# Patient Record
Sex: MALE | Race: WHITE | NOT HISPANIC OR LATINO
[De-identification: names, ages, dates, MRNs, and addresses within clinical notes are randomized per-mention and may not be internally consistent; named-entity substitution may affect disease eponyms.]

---

## 2017-02-14 ENCOUNTER — PRIOR ORIGINAL RECORDS (OUTPATIENT)
Dept: OTHER | Age: 67
End: 2017-02-14

## 2017-02-22 ENCOUNTER — HOSPITAL ENCOUNTER (EMERGENCY)
Age: 67
Discharge: HOME | End: 2017-02-22
Attending: EMERGENCY MEDICINE
Payer: MEDICARE

## 2017-02-22 VITALS
WEIGHT: 245 LBS | DIASTOLIC BLOOD PRESSURE: 66 MMHG | RESPIRATION RATE: 20 BRPM | HEART RATE: 92 BPM | SYSTOLIC BLOOD PRESSURE: 107 MMHG | TEMPERATURE: 98 F | BODY MASS INDEX: 35.07 KG/M2 | OXYGEN SATURATION: 95 % | HEIGHT: 70 IN

## 2017-02-22 DIAGNOSIS — B34.9 VIRAL SYNDROME: ICD-10-CM

## 2017-02-22 DIAGNOSIS — R11.2 NAUSEA AND VOMITING IN ADULT: Primary | ICD-10-CM

## 2017-02-22 LAB
ALBUMIN SERPL-MCNC: 3.4 G/DL (ref 3.5–4.8)
ALP LIVER SERPL-CCNC: 67 U/L (ref 45–117)
ALT SERPL-CCNC: 33 U/L (ref 17–63)
AST SERPL-CCNC: 15 U/L (ref 15–41)
BASOPHILS # BLD AUTO: 0.03 X10(3) UL (ref 0–0.1)
BASOPHILS NFR BLD AUTO: 0.4 %
BILIRUB SERPL-MCNC: 0.5 MG/DL (ref 0.1–2)
BUN BLD-MCNC: 23 MG/DL (ref 8–20)
CALCIUM BLD-MCNC: 7.1 MG/DL (ref 8.3–10.3)
CHLORIDE: 103 MMOL/L (ref 101–111)
CO2: 27 MMOL/L (ref 22–32)
CREAT BLD-MCNC: 0.92 MG/DL (ref 0.7–1.3)
EOSINOPHIL # BLD AUTO: 0.05 X10(3) UL (ref 0–0.3)
EOSINOPHIL NFR BLD AUTO: 0.7 %
ERYTHROCYTE [DISTWIDTH] IN BLOOD BY AUTOMATED COUNT: 13.1 % (ref 11.5–16)
GLUCOSE BLD-MCNC: 191 MG/DL (ref 70–99)
HCT VFR BLD AUTO: 45.2 % (ref 37–53)
HGB BLD-MCNC: 15.4 G/DL (ref 13–17)
IMMATURE GRANULOCYTE COUNT: 0.01 X10(3) UL (ref 0–1)
IMMATURE GRANULOCYTE RATIO %: 0.1 %
LIPASE: 134 U/L (ref 73–393)
LYMPHOCYTES # BLD AUTO: 0.88 X10(3) UL (ref 0.9–4)
LYMPHOCYTES NFR BLD AUTO: 11.8 %
M PROTEIN MFR SERPL ELPH: 6.6 G/DL (ref 6.1–8.3)
MCH RBC QN AUTO: 30.5 PG (ref 27–33.2)
MCHC RBC AUTO-ENTMCNC: 34.1 G/DL (ref 31–37)
MCV RBC AUTO: 89.5 FL (ref 80–99)
MONOCYTES # BLD AUTO: 0.82 X10(3) UL (ref 0.1–0.6)
MONOCYTES NFR BLD AUTO: 11 %
NEUTROPHIL ABS PRELIM: 5.67 X10 (3) UL (ref 1.3–6.7)
NEUTROPHILS # BLD AUTO: 5.67 X10(3) UL (ref 1.3–6.7)
NEUTROPHILS NFR BLD AUTO: 76 %
PLATELET # BLD AUTO: 181 10(3)UL (ref 150–450)
POTASSIUM SERPL-SCNC: 4.1 MMOL/L (ref 3.6–5.1)
RBC # BLD AUTO: 5.05 X10(6)UL (ref 3.8–5.8)
RED CELL DISTRIBUTION WIDTH-SD: 42.8 FL (ref 35.1–46.3)
SODIUM SERPL-SCNC: 137 MMOL/L (ref 136–144)
WBC # BLD AUTO: 7.5 X10(3) UL (ref 4–13)

## 2017-02-22 PROCEDURE — 85025 COMPLETE CBC W/AUTO DIFF WBC: CPT

## 2017-02-22 PROCEDURE — 80053 COMPREHEN METABOLIC PANEL: CPT

## 2017-02-22 PROCEDURE — 99284 EMERGENCY DEPT VISIT MOD MDM: CPT

## 2017-02-22 PROCEDURE — 96374 THER/PROPH/DIAG INJ IV PUSH: CPT

## 2017-02-22 PROCEDURE — 82272 OCCULT BLD FECES 1-3 TESTS: CPT

## 2017-02-22 PROCEDURE — 96361 HYDRATE IV INFUSION ADD-ON: CPT

## 2017-02-22 PROCEDURE — 83690 ASSAY OF LIPASE: CPT

## 2017-02-22 RX ORDER — ONDANSETRON 4 MG/1
4 TABLET, ORALLY DISINTEGRATING ORAL EVERY 4 HOURS PRN
Qty: 10 TABLET | Refills: 0 | Status: SHIPPED | OUTPATIENT
Start: 2017-02-22 | End: 2017-03-01

## 2017-02-22 RX ORDER — OMEPRAZOLE 20 MG/1
20 CAPSULE, DELAYED RELEASE ORAL
COMMUNITY

## 2017-02-22 RX ORDER — SODIUM CHLORIDE 9 MG/ML
INJECTION, SOLUTION INTRAVENOUS ONCE
Status: COMPLETED | OUTPATIENT
Start: 2017-02-22 | End: 2017-02-22

## 2017-02-22 RX ORDER — ONDANSETRON 2 MG/ML
4 INJECTION INTRAMUSCULAR; INTRAVENOUS ONCE
Status: COMPLETED | OUTPATIENT
Start: 2017-02-22 | End: 2017-02-22

## 2017-02-22 NOTE — ED INITIAL ASSESSMENT (HPI)
Pt started feeling nauseated on Monday and vomiting on Monday took pepto bismol and now having black stools.

## 2017-03-08 ENCOUNTER — PRIOR ORIGINAL RECORDS (OUTPATIENT)
Dept: OTHER | Age: 67
End: 2017-03-08

## 2017-03-08 ENCOUNTER — LAB ENCOUNTER (OUTPATIENT)
Dept: LAB | Age: 67
End: 2017-03-08
Attending: INTERNAL MEDICINE
Payer: MEDICARE

## 2017-03-08 DIAGNOSIS — E78.00 PURE HYPERCHOLESTEROLEMIA: ICD-10-CM

## 2017-03-08 DIAGNOSIS — I25.10 CAD (CORONARY ARTERY DISEASE): Primary | ICD-10-CM

## 2017-03-08 DIAGNOSIS — I10 HYPERTENSION: ICD-10-CM

## 2017-03-08 LAB
ALBUMIN SERPL-MCNC: 3.9 G/DL (ref 3.5–4.8)
ALP LIVER SERPL-CCNC: 102 U/L (ref 45–117)
ALT SERPL-CCNC: 32 U/L (ref 17–63)
AST SERPL-CCNC: 9 U/L (ref 15–41)
BASOPHILS # BLD AUTO: 0.06 X10(3) UL (ref 0–0.1)
BASOPHILS NFR BLD AUTO: 0.6 %
BILIRUB SERPL-MCNC: 0.6 MG/DL (ref 0.1–2)
BUN BLD-MCNC: 32 MG/DL (ref 8–20)
CALCIUM BLD-MCNC: 8.9 MG/DL (ref 8.3–10.3)
CHLORIDE: 103 MMOL/L (ref 101–111)
CHOLEST SMN-MCNC: 134 MG/DL (ref ?–200)
CO2: 27 MMOL/L (ref 22–32)
CREAT BLD-MCNC: 0.99 MG/DL (ref 0.7–1.3)
EOSINOPHIL # BLD AUTO: 0.1 X10(3) UL (ref 0–0.3)
EOSINOPHIL NFR BLD AUTO: 1 %
ERYTHROCYTE [DISTWIDTH] IN BLOOD BY AUTOMATED COUNT: 13.3 % (ref 11.5–16)
GLUCOSE BLD-MCNC: 158 MG/DL (ref 70–99)
HCT VFR BLD AUTO: 44 % (ref 37–53)
HDLC SERPL-MCNC: 44 MG/DL (ref 45–?)
HDLC SERPL: 3.05 {RATIO} (ref ?–4.97)
HGB BLD-MCNC: 15.1 G/DL (ref 13–17)
IMMATURE GRANULOCYTE COUNT: 0.06 X10(3) UL (ref 0–1)
IMMATURE GRANULOCYTE RATIO %: 0.6 %
LDLC SERPL CALC-MCNC: 53 MG/DL (ref ?–130)
LYMPHOCYTES # BLD AUTO: 2 X10(3) UL (ref 0.9–4)
LYMPHOCYTES NFR BLD AUTO: 19.8 %
M PROTEIN MFR SERPL ELPH: 7 G/DL (ref 6.1–8.3)
MCH RBC QN AUTO: 31.1 PG (ref 27–33.2)
MCHC RBC AUTO-ENTMCNC: 34.3 G/DL (ref 31–37)
MCV RBC AUTO: 90.5 FL (ref 80–99)
MONOCYTES # BLD AUTO: 0.97 X10(3) UL (ref 0.1–0.6)
MONOCYTES NFR BLD AUTO: 9.6 %
NEUTROPHIL ABS PRELIM: 6.89 X10 (3) UL (ref 1.3–6.7)
NEUTROPHILS # BLD AUTO: 6.89 X10(3) UL (ref 1.3–6.7)
NEUTROPHILS NFR BLD AUTO: 68.4 %
NONHDLC SERPL-MCNC: 90 MG/DL (ref ?–130)
PLATELET # BLD AUTO: 209 10(3)UL (ref 150–450)
POTASSIUM SERPL-SCNC: 4.6 MMOL/L (ref 3.6–5.1)
RBC # BLD AUTO: 4.86 X10(6)UL (ref 3.8–5.8)
RED CELL DISTRIBUTION WIDTH-SD: 43.4 FL (ref 35.1–46.3)
SODIUM SERPL-SCNC: 138 MMOL/L (ref 136–144)
TRIGLYCERIDES: 187 MG/DL (ref ?–150)
VLDL: 37 MG/DL (ref 5–40)
WBC # BLD AUTO: 10.1 X10(3) UL (ref 4–13)

## 2017-03-08 PROCEDURE — 85025 COMPLETE CBC W/AUTO DIFF WBC: CPT

## 2017-03-08 PROCEDURE — 80053 COMPREHEN METABOLIC PANEL: CPT

## 2017-03-08 PROCEDURE — 36415 COLL VENOUS BLD VENIPUNCTURE: CPT

## 2017-03-08 PROCEDURE — 80061 LIPID PANEL: CPT

## 2017-03-10 ENCOUNTER — PRIOR ORIGINAL RECORDS (OUTPATIENT)
Dept: OTHER | Age: 67
End: 2017-03-10

## 2017-03-14 LAB
ALBUMIN: 3.9 G/DL
ALKALINE PHOSPHATATE(ALK PHOS): 102 IU/L
BILIRUBIN TOTAL: 0.6 MG/DL
BUN: 32 MG/DL
CALCIUM: 8.9 MG/DL
CHLORIDE: 103 MEQ/L
CHOLESTEROL, TOTAL: 134 MG/DL
CREATININE, SERUM: 0.99 MG/DL
GLUCOSE: 158 MG/DL
HDL CHOLESTEROL: 44 MG/DL
HEMATOCRIT: 44 %
HEMOGLOBIN: 15.1 G/DL
LDL CHOLESTEROL: 53 MG/DL
PLATELETS: 209 K/UL
POTASSIUM, SERUM: 4.6 MEQ/L
PROTEIN, TOTAL: 7 G/DL
RED BLOOD COUNT: 4.86 X 10-6/U
SGOT (AST): 9 IU/L
SGPT (ALT): 32 IU/L
SODIUM: 138 MEQ/L
TRIGLYCERIDES: 187 MG/DL
WHITE BLOOD COUNT: 10.1 X 10-3/U

## 2017-09-02 ENCOUNTER — APPOINTMENT (OUTPATIENT)
Dept: GENERAL RADIOLOGY | Age: 67
End: 2017-09-02
Attending: EMERGENCY MEDICINE
Payer: MEDICARE

## 2017-09-02 ENCOUNTER — HOSPITAL ENCOUNTER (EMERGENCY)
Age: 67
Discharge: HOME | End: 2017-09-02
Attending: EMERGENCY MEDICINE
Payer: MEDICARE

## 2017-09-02 VITALS
OXYGEN SATURATION: 95 % | DIASTOLIC BLOOD PRESSURE: 90 MMHG | TEMPERATURE: 98 F | SYSTOLIC BLOOD PRESSURE: 146 MMHG | BODY MASS INDEX: 36.36 KG/M2 | RESPIRATION RATE: 16 BRPM | WEIGHT: 254 LBS | HEART RATE: 87 BPM | HEIGHT: 70 IN

## 2017-09-02 DIAGNOSIS — S80.11XA CONTUSION OF LOWER EXTREMITY, RIGHT, INITIAL ENCOUNTER: Primary | ICD-10-CM

## 2017-09-02 DIAGNOSIS — T14.8XXA HEMATOMA: ICD-10-CM

## 2017-09-02 PROCEDURE — 73590 X-RAY EXAM OF LOWER LEG: CPT | Performed by: EMERGENCY MEDICINE

## 2017-09-02 PROCEDURE — 99283 EMERGENCY DEPT VISIT LOW MDM: CPT

## 2017-09-02 NOTE — ED INITIAL ASSESSMENT (HPI)
Pt states that he \"hit my leg on the side of the bathtub, and now have this big bruise\". Hematoma noted to RLE. + CMS noted. States he takes plavix.

## 2017-09-02 NOTE — ED PROVIDER NOTES
Patient Seen in: [facility] Emergency Department In [place]    History   Patient presents with:  Lower Extremity Injury (musculoskeletal)    Stated Complaint:     HPI    The patient is a 38-year-old male who slipped in the bathtub and injured his right ti daily. Patient taking differently: Take 50 mg by mouth 2 (two) times daily. spironolactone 25 MG Oral Tab,  Take 1 tablet (25 mg total) by mouth daily. Atorvastatin Calcium 40 MG Oral Tab,  Take 1 tablet (40 mg total) by mouth nightly.    Valsartan-H no signs of trauma. Oral mucosa is wet. Lungs: Clear to auscultation without wheezing or retractions    Cardiovascular: Regular without murmurs    Extremities: Good pulses bilaterally. There is bruising noted on the right medial tib-fib.   There is n

## 2017-12-05 ENCOUNTER — HOSPITAL ENCOUNTER (EMERGENCY)
Age: 67
Discharge: HOME | End: 2017-12-05
Payer: MEDICARE

## 2017-12-05 VITALS
RESPIRATION RATE: 16 BRPM | WEIGHT: 260 LBS | SYSTOLIC BLOOD PRESSURE: 158 MMHG | BODY MASS INDEX: 37 KG/M2 | DIASTOLIC BLOOD PRESSURE: 86 MMHG | TEMPERATURE: 98 F | HEART RATE: 85 BPM | OXYGEN SATURATION: 96 %

## 2017-12-05 DIAGNOSIS — S61.215A LACERATION OF LEFT RING FINGER WITHOUT FOREIGN BODY WITHOUT DAMAGE TO NAIL, INITIAL ENCOUNTER: Primary | ICD-10-CM

## 2017-12-05 PROCEDURE — 99283 EMERGENCY DEPT VISIT LOW MDM: CPT

## 2017-12-05 PROCEDURE — 90471 IMMUNIZATION ADMIN: CPT

## 2017-12-05 NOTE — ED PROVIDER NOTES
Patient Seen in: [facility] Emergency Department In [place]    History   Patient presents with:  Laceration Abrasion (integumentary)    Stated Complaint: left 4th finger laceration on garage ladder    HPI    CHIEF COMPLAINT: Finger laceration     HISTORY Hyperlipidemia associated with type 2 diabetes mellitus ([facility] 10/5/2016   • Hypertension    • Hypertension associated with diabetes ([facility] 10/5/2016   • Long-term insulin use ([facility] 10/5/2016   • Sleep apnea    • Type II or unspecified type diabetes mellitus aspect of the left fourth digit there is a small flap laceration that is not actively bleeding. There is no nailbed involvement. The wound is irrigated and no foreign body is noted. There is no surrounding erythema. No draining noted.   Patient was dres

## 2018-02-27 ENCOUNTER — MYAURORA ACCOUNT LINK (OUTPATIENT)
Dept: OTHER | Age: 68
End: 2018-02-27

## 2018-02-27 ENCOUNTER — PRIOR ORIGINAL RECORDS (OUTPATIENT)
Dept: OTHER | Age: 68
End: 2018-02-27

## 2018-05-24 ENCOUNTER — OFFICE VISIT (OUTPATIENT)
Dept: FAMILY MEDICINE CLINIC | Facility: CLINIC | Age: 68
End: 2018-05-24

## 2018-05-24 VITALS
DIASTOLIC BLOOD PRESSURE: 78 MMHG | HEART RATE: 78 BPM | TEMPERATURE: 99 F | WEIGHT: 271 LBS | OXYGEN SATURATION: 97 % | HEIGHT: 70 IN | SYSTOLIC BLOOD PRESSURE: 136 MMHG | BODY MASS INDEX: 38.8 KG/M2 | RESPIRATION RATE: 20 BRPM

## 2018-05-24 DIAGNOSIS — L03.115 CELLULITIS OF RIGHT LOWER EXTREMITY: Primary | ICD-10-CM

## 2018-05-24 PROCEDURE — 99202 OFFICE O/P NEW SF 15 MIN: CPT | Performed by: NURSE PRACTITIONER

## 2018-05-24 RX ORDER — ESOMEPRAZOLE MAGNESIUM 40 MG/1
40 CAPSULE, DELAYED RELEASE ORAL
COMMUNITY
Start: 2012-10-24

## 2018-05-24 RX ORDER — DULOXETIN HYDROCHLORIDE 30 MG/1
CAPSULE, DELAYED RELEASE ORAL
COMMUNITY
Start: 2018-05-21

## 2018-05-24 RX ORDER — INSULIN LISPRO 100 [IU]/ML
INJECTION, SOLUTION INTRAVENOUS; SUBCUTANEOUS
COMMUNITY
Start: 2018-03-01

## 2018-05-24 RX ORDER — CEPHALEXIN 500 MG/1
500 CAPSULE ORAL 4 TIMES DAILY
Qty: 28 CAPSULE | Refills: 0 | Status: SHIPPED | OUTPATIENT
Start: 2018-05-24 | End: 2018-05-31

## 2018-05-24 RX ORDER — CARISOPRODOL 350 MG/1
350 TABLET ORAL
COMMUNITY
Start: 2013-09-18

## 2018-05-24 RX ORDER — NABUMETONE 750 MG/1
TABLET, FILM COATED ORAL
COMMUNITY
Start: 2018-05-14

## 2018-05-24 RX ORDER — TESTOSTERONE CYPIONATE 200 MG/ML
INJECTION INTRAMUSCULAR
COMMUNITY
Start: 2018-04-18

## 2018-05-24 RX ORDER — GLIPIZIDE 10 MG/1
10 TABLET ORAL
COMMUNITY
Start: 2012-10-24 | End: 2018-05-24

## 2018-05-24 RX ORDER — FUROSEMIDE 40 MG/1
40 TABLET ORAL
COMMUNITY
Start: 2012-10-24

## 2018-05-24 RX ORDER — HYDRALAZINE HYDROCHLORIDE 50 MG/1
50 TABLET, FILM COATED ORAL 2 TIMES DAILY
COMMUNITY
Start: 2016-08-25

## 2018-05-24 RX ORDER — POTASSIUM CHLORIDE 750 MG/1
TABLET, EXTENDED RELEASE ORAL
COMMUNITY
Start: 2012-12-05

## 2018-05-24 RX ORDER — ISOSORBIDE MONONITRATE 30 MG/1
30 TABLET, EXTENDED RELEASE ORAL
COMMUNITY
Start: 2016-10-26

## 2018-05-24 NOTE — PATIENT INSTRUCTIONS
Cellulitis  Cellulitis is an infection of the deep layers of skin. A break in the skin, such as a cut or scratch, can let bacteria under the skin. If the bacteria get to deep layers of the skin, it can be serious.  If not treated, cellulitis can get into · Fever higher of 100.4º F (38.0º C) or higher after 2 days on antibiotics  Date Last Reviewed: 9/1/2016  © 6169-0190 [employer]. 1407 [facility], [address] [place] [address]. All rights reserved.  This information is not intended as a substitut

## 2018-05-24 NOTE — PROGRESS NOTES
CHIEF COMPLAINT:   Patient presents with:  Derm Problem: right leg/ankle/foot swelling and soreness x2 days    HPI:     Gila Castellon is a 69year old male who presents with concerns of skin infection. Patient first noticed symptoms 2 days ago.   Reports e Atorvastatin Calcium 40 MG Oral Tab Take 1 tablet (40 mg total) by mouth nightly. Disp: 30 tablet Rfl: 6   allopurinol 300 MG Oral Tab Take 300 mg by mouth daily. Disp:  Rfl:    Clopidogrel Bisulfate 75 MG Oral Tab Take 75 mg by mouth daily.  Disp:  Rfl: CARDIOVASCULAR: denies chest pain on exertion or rest.  GI: no nausea, no vomiting or abdominal pain  NEURO: no abnormal sensation, no tingling of the skin or numbness.     EXAM:   /78   Pulse 78   Temp 98.8 °F (37.1 °C) (Oral)   Resp 20   Ht 70\"   W Cellulitis is an infection of the deep layers of skin. A break in the skin, such as a cut or scratch, can let bacteria under the skin. If the bacteria get to deep layers of the skin, it can be serious.  If not treated, cellulitis can get into the bloodstrea Date Last Reviewed: 9/1/2016  © 4943-8812 [employer]. 1407 [facility], [address]. All rights reserved. This information is not intended as a substitute for professional medical care.  Always follow your healthcare professional'

## 2018-10-25 ENCOUNTER — PRIOR ORIGINAL RECORDS (OUTPATIENT)
Dept: OTHER | Age: 68
End: 2018-10-25

## 2019-02-28 VITALS
DIASTOLIC BLOOD PRESSURE: 80 MMHG | HEIGHT: 70 IN | BODY MASS INDEX: 38.37 KG/M2 | HEART RATE: 84 BPM | SYSTOLIC BLOOD PRESSURE: 112 MMHG | WEIGHT: 268 LBS

## 2019-03-01 VITALS
HEIGHT: 70 IN | DIASTOLIC BLOOD PRESSURE: 70 MMHG | WEIGHT: 253 LBS | BODY MASS INDEX: 36.22 KG/M2 | HEART RATE: 92 BPM | SYSTOLIC BLOOD PRESSURE: 130 MMHG

## 2019-03-29 RX ORDER — ATORVASTATIN CALCIUM 40 MG/1
TABLET, FILM COATED ORAL
Qty: 90 TABLET | Refills: 0 | Status: SHIPPED | OUTPATIENT
Start: 2019-03-29 | End: 2019-06-23 | Stop reason: SDUPTHER

## 2019-03-29 RX ORDER — SPIRONOLACTONE 25 MG/1
TABLET ORAL
Qty: 90 TABLET | Refills: 0 | Status: SHIPPED | OUTPATIENT
Start: 2019-03-29 | End: 2019-06-23 | Stop reason: SDUPTHER

## 2019-05-06 RX ORDER — HYDRALAZINE HYDROCHLORIDE 50 MG/1
TABLET, FILM COATED ORAL
Qty: 180 TABLET | Refills: 0 | Status: SHIPPED | OUTPATIENT
Start: 2019-05-06 | End: 2019-07-31 | Stop reason: SDUPTHER

## 2019-05-06 RX ORDER — ISOSORBIDE MONONITRATE 30 MG/1
TABLET, EXTENDED RELEASE ORAL
Qty: 90 TABLET | Refills: 0 | Status: SHIPPED | OUTPATIENT
Start: 2019-05-06 | End: 2019-07-31 | Stop reason: SDUPTHER

## 2019-06-24 RX ORDER — ATORVASTATIN CALCIUM 40 MG/1
TABLET, FILM COATED ORAL
Qty: 90 TABLET | Refills: 0 | Status: SHIPPED | OUTPATIENT
Start: 2019-06-24 | End: 2019-09-20 | Stop reason: SDUPTHER

## 2019-06-24 RX ORDER — SPIRONOLACTONE 25 MG/1
TABLET ORAL
Qty: 90 TABLET | Refills: 0 | Status: SHIPPED | OUTPATIENT
Start: 2019-06-24 | End: 2019-09-20 | Stop reason: SDUPTHER

## 2019-07-23 ENCOUNTER — OFFICE VISIT (OUTPATIENT)
Dept: CARDIOLOGY | Age: 69
End: 2019-07-23

## 2019-07-23 VITALS
DIASTOLIC BLOOD PRESSURE: 74 MMHG | HEART RATE: 95 BPM | WEIGHT: 259 LBS | HEIGHT: 69 IN | SYSTOLIC BLOOD PRESSURE: 140 MMHG | BODY MASS INDEX: 38.36 KG/M2

## 2019-07-23 DIAGNOSIS — I10 ESSENTIAL HYPERTENSION: ICD-10-CM

## 2019-07-23 DIAGNOSIS — E78.2 HYPERLIPIDEMIA, MIXED: ICD-10-CM

## 2019-07-23 DIAGNOSIS — I25.10 CORONARY ARTERY DISEASE INVOLVING NATIVE CORONARY ARTERY OF NATIVE HEART WITHOUT ANGINA PECTORIS: Primary | ICD-10-CM

## 2019-07-23 PROCEDURE — 99214 OFFICE O/P EST MOD 30 MIN: CPT | Performed by: INTERNAL MEDICINE

## 2019-07-23 RX ORDER — GLIPIZIDE 10 MG/1
10 TABLET ORAL
COMMUNITY

## 2019-07-23 RX ORDER — CLOPIDOGREL BISULFATE 75 MG/1
75 TABLET ORAL DAILY
COMMUNITY
End: 2020-08-25 | Stop reason: ALTCHOICE

## 2019-07-23 RX ORDER — ISOSORBIDE DINITRATE 30 MG/1
30 TABLET ORAL DAILY
COMMUNITY
End: 2020-07-28 | Stop reason: CLARIF

## 2019-07-23 RX ORDER — ESOMEPRAZOLE MAGNESIUM 40 MG/1
40 CAPSULE, DELAYED RELEASE ORAL 2 TIMES DAILY
COMMUNITY

## 2019-07-23 RX ORDER — DULOXETIN HYDROCHLORIDE 30 MG/1
30 CAPSULE, DELAYED RELEASE ORAL DAILY
COMMUNITY
End: 2020-08-01 | Stop reason: ALTCHOICE

## 2019-07-23 RX ORDER — NABUMETONE 750 MG/1
750 TABLET, FILM COATED ORAL 2 TIMES DAILY PRN
COMMUNITY

## 2019-07-23 RX ORDER — ALLOPURINOL 300 MG/1
300 TABLET ORAL DAILY
COMMUNITY

## 2019-07-31 RX ORDER — HYDRALAZINE HYDROCHLORIDE 50 MG/1
TABLET, FILM COATED ORAL
Qty: 180 TABLET | Refills: 3 | Status: SHIPPED | OUTPATIENT
Start: 2019-07-31 | End: 2020-08-14

## 2019-07-31 RX ORDER — ISOSORBIDE MONONITRATE 30 MG/1
TABLET, EXTENDED RELEASE ORAL
Qty: 90 TABLET | Refills: 3 | Status: SHIPPED | OUTPATIENT
Start: 2019-07-31 | End: 2020-08-31

## 2019-09-20 RX ORDER — SPIRONOLACTONE 25 MG/1
TABLET ORAL
Qty: 90 TABLET | Refills: 4 | Status: SHIPPED | OUTPATIENT
Start: 2019-09-20 | End: 2020-09-25

## 2019-09-20 RX ORDER — ATORVASTATIN CALCIUM 40 MG/1
TABLET, FILM COATED ORAL
Qty: 90 TABLET | Refills: 4 | Status: SHIPPED | OUTPATIENT
Start: 2019-09-20 | End: 2020-10-19

## 2020-01-01 ENCOUNTER — EXTERNAL RECORD (OUTPATIENT)
Dept: OTHER | Age: 70
End: 2020-01-01

## 2020-07-30 ENCOUNTER — TELEPHONE (OUTPATIENT)
Dept: CARDIOLOGY | Age: 70
End: 2020-07-30

## 2020-08-14 RX ORDER — HYDRALAZINE HYDROCHLORIDE 50 MG/1
TABLET, FILM COATED ORAL
Qty: 180 TABLET | Refills: 3 | Status: SHIPPED | OUTPATIENT
Start: 2020-08-14 | End: 2021-02-26 | Stop reason: SDUPTHER

## 2020-08-25 ENCOUNTER — OFFICE VISIT (OUTPATIENT)
Dept: CARDIOLOGY | Age: 70
End: 2020-08-25

## 2020-08-25 VITALS
DIASTOLIC BLOOD PRESSURE: 80 MMHG | HEIGHT: 69 IN | BODY MASS INDEX: 38.95 KG/M2 | WEIGHT: 263 LBS | SYSTOLIC BLOOD PRESSURE: 134 MMHG | HEART RATE: 84 BPM

## 2020-08-25 DIAGNOSIS — I10 ESSENTIAL HYPERTENSION: ICD-10-CM

## 2020-08-25 DIAGNOSIS — G47.33 SLEEP APNEA, OBSTRUCTIVE: ICD-10-CM

## 2020-08-25 DIAGNOSIS — E66.2 CLASS 2 OBESITY WITH ALVEOLAR HYPOVENTILATION WITHOUT SERIOUS COMORBIDITY WITH BODY MASS INDEX (BMI) OF 35.0 TO 35.9 IN ADULT (CMD): ICD-10-CM

## 2020-08-25 DIAGNOSIS — I25.10 CORONARY ARTERY DISEASE INVOLVING NATIVE CORONARY ARTERY OF NATIVE HEART WITHOUT ANGINA PECTORIS: Primary | ICD-10-CM

## 2020-08-25 DIAGNOSIS — R06.09 DOE (DYSPNEA ON EXERTION): ICD-10-CM

## 2020-08-25 DIAGNOSIS — E78.2 HYPERLIPIDEMIA, MIXED: ICD-10-CM

## 2020-08-25 PROBLEM — E66.9 OBESITY: Status: ACTIVE | Noted: 2020-08-25

## 2020-08-25 PROCEDURE — 99214 OFFICE O/P EST MOD 30 MIN: CPT | Performed by: CLINICAL NURSE SPECIALIST

## 2020-08-25 SDOH — HEALTH STABILITY: PHYSICAL HEALTH: ON AVERAGE, HOW MANY DAYS PER WEEK DO YOU ENGAGE IN MODERATE TO STRENUOUS EXERCISE (LIKE A BRISK WALK)?: 0 DAYS

## 2020-08-25 SDOH — HEALTH STABILITY: PHYSICAL HEALTH: ON AVERAGE, HOW MANY MINUTES DO YOU ENGAGE IN EXERCISE AT THIS LEVEL?: 0 MIN

## 2020-08-25 ASSESSMENT — ENCOUNTER SYMPTOMS
FEVER: 0
WEIGHT GAIN: 0
WEIGHT LOSS: 0
COUGH: 0
ALLERGIC/IMMUNOLOGIC COMMENTS: NO NEW FOOD ALLERGIES
SUSPICIOUS LESIONS: 0
CHILLS: 0
BRUISES/BLEEDS EASILY: 0
HEMOPTYSIS: 0
HEMATOCHEZIA: 0

## 2020-08-26 ENCOUNTER — APPOINTMENT (OUTPATIENT)
Dept: LAB | Age: 70
End: 2020-08-26
Attending: OTOLARYNGOLOGY
Payer: MEDICARE

## 2020-08-26 DIAGNOSIS — Z01.818 OTHER SPECIFIED PRE-OPERATIVE EXAMINATION: ICD-10-CM

## 2020-08-26 DIAGNOSIS — Z11.59 ENCOUNTER FOR SCREENING FOR OTHER VIRAL DISEASES: ICD-10-CM

## 2020-08-27 LAB — SARS-COV-2 RNA RESP QL NAA+PROBE: NOT DETECTED

## 2020-08-28 ENCOUNTER — HOSPITAL ENCOUNTER (OUTPATIENT)
Dept: GENERAL RADIOLOGY | Age: 70
Discharge: HOME | End: 2020-08-28
Attending: OTOLARYNGOLOGY
Payer: MEDICARE

## 2020-08-28 DIAGNOSIS — K21.9 LARYNGOPHARYNGEAL REFLUX: ICD-10-CM

## 2020-08-28 PROCEDURE — 74221 X-RAY XM ESOPHAGUS 2CNTRST: CPT | Performed by: OTOLARYNGOLOGY

## 2020-08-31 ENCOUNTER — APPOINTMENT (OUTPATIENT)
Dept: LAB | Age: 70
End: 2020-08-31
Attending: CLINICAL NURSE SPECIALIST
Payer: MEDICARE

## 2020-08-31 DIAGNOSIS — Z11.59 ENCOUNTER FOR SCREENING FOR OTHER VIRAL DISEASES: ICD-10-CM

## 2020-08-31 DIAGNOSIS — Z01.818 OTHER SPECIFIED PRE-OPERATIVE EXAMINATION: ICD-10-CM

## 2020-08-31 LAB
SARS-COV-2 RNA SPEC QL NAA+PROBE: NOT DETECTED
SPECIMEN SOURCE: NORMAL

## 2020-08-31 RX ORDER — ISOSORBIDE MONONITRATE 30 MG/1
TABLET, EXTENDED RELEASE ORAL
Qty: 90 TABLET | Refills: 3 | Status: SHIPPED | OUTPATIENT
Start: 2020-08-31

## 2020-09-01 LAB — SARS-COV-2 RNA RESP QL NAA+PROBE: NOT DETECTED

## 2020-09-04 ENCOUNTER — HOSPITAL ENCOUNTER (OUTPATIENT)
Dept: CV DIAGNOSTICS | Facility: HOSPITAL | Age: 70
Discharge: HOME | End: 2020-09-04
Attending: CLINICAL NURSE SPECIALIST
Payer: MEDICARE

## 2020-09-04 DIAGNOSIS — I10 HYPERTENSION: ICD-10-CM

## 2020-09-04 DIAGNOSIS — I25.10 CORONARY ATHEROSCLEROSIS OF NATIVE CORONARY ARTERY: ICD-10-CM

## 2020-09-04 PROCEDURE — 93017 CV STRESS TEST TRACING ONLY: CPT | Performed by: CLINICAL NURSE SPECIALIST

## 2020-09-04 PROCEDURE — 78452 HT MUSCLE IMAGE SPECT MULT: CPT | Performed by: CLINICAL NURSE SPECIALIST

## 2020-09-04 PROCEDURE — 93018 CV STRESS TEST I&R ONLY: CPT | Performed by: CLINICAL NURSE SPECIALIST

## 2020-09-10 ENCOUNTER — TELEPHONE (OUTPATIENT)
Dept: CARDIOLOGY | Age: 70
End: 2020-09-10

## 2020-09-25 RX ORDER — SPIRONOLACTONE 25 MG/1
25 TABLET ORAL DAILY
Qty: 90 TABLET | Refills: 3 | Status: SHIPPED | OUTPATIENT
Start: 2020-09-25

## 2020-10-19 RX ORDER — ATORVASTATIN CALCIUM 40 MG/1
40 TABLET, FILM COATED ORAL AT BEDTIME
Qty: 30 TABLET | Refills: 0 | Status: SHIPPED | OUTPATIENT
Start: 2020-10-19 | End: 2020-12-28

## 2020-11-17 ENCOUNTER — APPOINTMENT (OUTPATIENT)
Dept: CARDIOLOGY | Age: 70
End: 2020-11-17

## 2020-12-26 ENCOUNTER — TELEPHONE (OUTPATIENT)
Dept: CARDIOLOGY | Age: 70
End: 2020-12-26

## 2020-12-28 RX ORDER — ATORVASTATIN CALCIUM 40 MG/1
TABLET, FILM COATED ORAL
Qty: 30 TABLET | Refills: 0 | Status: SHIPPED | OUTPATIENT
Start: 2020-12-28 | End: 2021-02-03

## 2020-12-29 ENCOUNTER — LAB ENCOUNTER (OUTPATIENT)
Dept: LAB | Age: 70
End: 2020-12-29
Attending: CLINICAL NURSE SPECIALIST
Payer: MEDICARE

## 2020-12-29 DIAGNOSIS — E78.2 MIXED HYPERLIPIDEMIA: ICD-10-CM

## 2020-12-29 DIAGNOSIS — I10 ESSENTIAL HYPERTENSION, MALIGNANT: Primary | ICD-10-CM

## 2020-12-29 LAB
ALBUMIN SERPL-MCNC: 3.9 G/DL (ref 3.4–5)
ALBUMIN/GLOB SERPL: 1.2 {RATIO} (ref 1–2)
ALP LIVER SERPL-CCNC: 84 U/L
ALT SERPL-CCNC: 66 U/L
ANION GAP SERPL CALC-SCNC: 3 MMOL/L (ref 0–18)
AST SERPL-CCNC: 34 U/L (ref 15–37)
BILIRUB SERPL-MCNC: 0.6 MG/DL (ref 0.1–2)
BUN BLD-MCNC: 24 MG/DL (ref 7–18)
BUN/CREAT SERPL: 20.9 (ref 10–20)
CALCIUM BLD-MCNC: 9.5 MG/DL (ref 8.5–10.1)
CHLORIDE SERPL-SCNC: 105 MMOL/L (ref 98–112)
CHOLEST SMN-MCNC: 147 MG/DL (ref ?–200)
CO2 SERPL-SCNC: 32 MMOL/L (ref 21–32)
CREAT BLD-MCNC: 1.15 MG/DL
GLOBULIN PLAS-MCNC: 3.2 G/DL (ref 2.8–4.4)
GLUCOSE BLD-MCNC: 90 MG/DL (ref 70–99)
HDLC SERPL-MCNC: 49 MG/DL (ref 40–59)
LDLC SERPL CALC-MCNC: 73 MG/DL (ref ?–100)
M PROTEIN MFR SERPL ELPH: 7.1 G/DL (ref 6.4–8.2)
NONHDLC SERPL-MCNC: 98 MG/DL (ref ?–130)
OSMOLALITY SERPL CALC.SUM OF ELEC: 294 MOSM/KG (ref 275–295)
PATIENT FASTING Y/N/NP: YES
PATIENT FASTING Y/N/NP: YES
POTASSIUM SERPL-SCNC: 4.1 MMOL/L (ref 3.5–5.1)
SODIUM SERPL-SCNC: 140 MMOL/L (ref 136–145)
TRIGL SERPL-MCNC: 124 MG/DL (ref 30–149)
VLDLC SERPL CALC-MCNC: 25 MG/DL (ref 0–30)

## 2020-12-29 PROCEDURE — 36415 COLL VENOUS BLD VENIPUNCTURE: CPT

## 2020-12-29 PROCEDURE — 80053 COMPREHEN METABOLIC PANEL: CPT

## 2020-12-29 PROCEDURE — 80061 LIPID PANEL: CPT

## 2020-12-31 ENCOUNTER — TELEPHONE (OUTPATIENT)
Dept: CARDIOLOGY | Age: 70
End: 2020-12-31

## 2021-02-03 RX ORDER — ATORVASTATIN CALCIUM 40 MG/1
40 TABLET, FILM COATED ORAL AT BEDTIME
Qty: 90 TABLET | Refills: 3 | Status: SHIPPED | OUTPATIENT
Start: 2021-02-03

## 2021-02-26 ENCOUNTER — TELEPHONE (OUTPATIENT)
Dept: CARDIOLOGY | Age: 71
End: 2021-02-26

## 2021-02-26 RX ORDER — HYDRALAZINE HYDROCHLORIDE 50 MG/1
TABLET, FILM COATED ORAL
Qty: 14 TABLET | Refills: 0 | Status: SHIPPED | OUTPATIENT
Start: 2021-02-26 | End: 2021-03-12 | Stop reason: SDUPTHER

## 2021-03-12 ENCOUNTER — TELEPHONE (OUTPATIENT)
Dept: CARDIOLOGY | Age: 71
End: 2021-03-12

## 2021-03-12 RX ORDER — HYDRALAZINE HYDROCHLORIDE 50 MG/1
50 TABLET, FILM COATED ORAL 3 TIMES DAILY
Qty: 270 TABLET | Refills: 1 | Status: SHIPPED | OUTPATIENT
Start: 2021-03-12

## 2021-03-22 ENCOUNTER — APPOINTMENT (OUTPATIENT)
Dept: CARDIOLOGY | Age: 71
End: 2021-03-22

## 2022-02-19 ENCOUNTER — APPOINTMENT (OUTPATIENT)
Dept: GENERAL RADIOLOGY | Age: 72
End: 2022-02-19
Attending: EMERGENCY MEDICINE
Payer: MEDICARE

## 2022-02-19 ENCOUNTER — HOSPITAL ENCOUNTER (EMERGENCY)
Age: 72
Discharge: HOME | End: 2022-02-19
Attending: EMERGENCY MEDICINE
Payer: MEDICARE

## 2022-02-19 VITALS
RESPIRATION RATE: 22 BRPM | WEIGHT: 268.94 LBS | DIASTOLIC BLOOD PRESSURE: 80 MMHG | BODY MASS INDEX: 39 KG/M2 | OXYGEN SATURATION: 95 % | TEMPERATURE: 98 F | SYSTOLIC BLOOD PRESSURE: 153 MMHG | HEART RATE: 71 BPM

## 2022-02-19 DIAGNOSIS — R06.00 DYSPNEA ON EXERTION: Primary | ICD-10-CM

## 2022-02-19 LAB
ALBUMIN SERPL-MCNC: 3.8 G/DL (ref 3.4–5)
ALBUMIN/GLOB SERPL: 1.1 {RATIO} (ref 1–2)
ALP LIVER SERPL-CCNC: 91 U/L
ALT SERPL-CCNC: 31 U/L
AST SERPL-CCNC: 17 U/L (ref 15–37)
BASOPHILS # BLD AUTO: 0.04 X10(3) UL (ref 0–0.2)
BASOPHILS NFR BLD AUTO: 0.5 %
BILIRUB SERPL-MCNC: 0.6 MG/DL (ref 0.1–2)
BUN BLD-MCNC: 18 MG/DL (ref 7–18)
CALCIUM BLD-MCNC: 9 MG/DL (ref 8.5–10.1)
CHLORIDE SERPL-SCNC: 103 MMOL/L (ref 98–112)
CREAT BLD-MCNC: 1.06 MG/DL
EOSINOPHIL # BLD AUTO: 0.12 X10(3) UL (ref 0–0.7)
EOSINOPHIL NFR BLD AUTO: 1.5 %
ERYTHROCYTE [DISTWIDTH] IN BLOOD BY AUTOMATED COUNT: 16.2 %
GLOBULIN PLAS-MCNC: 3.4 G/DL (ref 2.8–4.4)
GLUCOSE BLD-MCNC: 212 MG/DL (ref 70–99)
HCT VFR BLD AUTO: 49.9 %
HGB BLD-MCNC: 15.1 G/DL
IMM GRANULOCYTES # BLD AUTO: 0.02 X10(3) UL (ref 0–1)
IMM GRANULOCYTES NFR BLD: 0.2 %
LYMPHOCYTES # BLD AUTO: 1.4 X10(3) UL (ref 1–4)
LYMPHOCYTES NFR BLD AUTO: 17.5 %
MCH RBC QN AUTO: 24.8 PG (ref 26–34)
MCHC RBC AUTO-ENTMCNC: 30.3 G/DL (ref 31–37)
MONOCYTES # BLD AUTO: 0.74 X10(3) UL (ref 0.1–1)
MONOCYTES NFR BLD AUTO: 9.2 %
NEUTROPHILS # BLD AUTO: 5.7 X10 (3) UL (ref 1.5–7.7)
NEUTROPHILS # BLD AUTO: 5.7 X10(3) UL (ref 1.5–7.7)
NEUTROPHILS NFR BLD AUTO: 71.1 %
NT-PROBNP SERPL-MCNC: 147 PG/ML (ref ?–125)
OSMOLALITY SERPL CALC.SUM OF ELEC: 292 MOSM/KG (ref 275–295)
PLATELET # BLD AUTO: 188 10(3)UL (ref 150–450)
POTASSIUM SERPL-SCNC: 4.5 MMOL/L (ref 3.5–5.1)
PROT SERPL-MCNC: 7.2 G/DL (ref 6.4–8.2)
RBC # BLD AUTO: 6.1 X10(6)UL
SODIUM SERPL-SCNC: 137 MMOL/L (ref 136–145)
TROPONIN I HIGH SENSITIVITY: 8 NG/L
WBC # BLD AUTO: 8 X10(3) UL (ref 4–11)

## 2022-02-19 PROCEDURE — 80053 COMPREHEN METABOLIC PANEL: CPT | Performed by: EMERGENCY MEDICINE

## 2022-02-19 PROCEDURE — 71045 X-RAY EXAM CHEST 1 VIEW: CPT | Performed by: EMERGENCY MEDICINE

## 2022-02-19 PROCEDURE — 84484 ASSAY OF TROPONIN QUANT: CPT | Performed by: EMERGENCY MEDICINE

## 2022-02-19 PROCEDURE — 85025 COMPLETE CBC W/AUTO DIFF WBC: CPT | Performed by: EMERGENCY MEDICINE

## 2022-02-19 PROCEDURE — 83880 ASSAY OF NATRIURETIC PEPTIDE: CPT | Performed by: EMERGENCY MEDICINE

## 2022-02-19 PROCEDURE — 99284 EMERGENCY DEPT VISIT MOD MDM: CPT

## 2022-02-19 PROCEDURE — 96374 THER/PROPH/DIAG INJ IV PUSH: CPT

## 2022-02-19 RX ORDER — FUROSEMIDE 10 MG/ML
40 INJECTION INTRAMUSCULAR; INTRAVENOUS ONCE
Status: COMPLETED | OUTPATIENT
Start: 2022-02-19 | End: 2022-02-19

## 2022-02-19 NOTE — ED INITIAL ASSESSMENT (HPI)
C/o SOB for over 2 wks worse when laying down and exertion. No cough or chest pain.  Had  Pulmonary function test last week

## 2022-03-25 ENCOUNTER — HOSPITAL ENCOUNTER (EMERGENCY)
Age: 72
Discharge: HOME | End: 2022-03-25
Payer: MEDICARE

## 2022-03-25 VITALS
SYSTOLIC BLOOD PRESSURE: 156 MMHG | OXYGEN SATURATION: 95 % | DIASTOLIC BLOOD PRESSURE: 70 MMHG | BODY MASS INDEX: 36.88 KG/M2 | HEIGHT: 69 IN | WEIGHT: 249 LBS | HEART RATE: 79 BPM | TEMPERATURE: 97 F | RESPIRATION RATE: 16 BRPM

## 2022-07-19 ENCOUNTER — APPOINTMENT (OUTPATIENT)
Dept: GENERAL RADIOLOGY | Facility: HOSPITAL | Age: 72
End: 2022-07-19
Attending: EMERGENCY MEDICINE
Payer: MEDICARE

## 2022-07-19 ENCOUNTER — HOSPITAL ENCOUNTER (EMERGENCY)
Facility: HOSPITAL | Age: 72
Discharge: HOME | End: 2022-07-19
Attending: EMERGENCY MEDICINE
Payer: MEDICARE

## 2022-07-19 ENCOUNTER — APPOINTMENT (OUTPATIENT)
Dept: CT IMAGING | Facility: HOSPITAL | Age: 72
End: 2022-07-19
Attending: EMERGENCY MEDICINE
Payer: MEDICARE

## 2022-07-19 ENCOUNTER — APPOINTMENT (OUTPATIENT)
Dept: MRI IMAGING | Facility: HOSPITAL | Age: 72
End: 2022-07-19
Attending: EMERGENCY MEDICINE
Payer: MEDICARE

## 2022-07-19 VITALS
DIASTOLIC BLOOD PRESSURE: 79 MMHG | OXYGEN SATURATION: 97 % | HEART RATE: 69 BPM | SYSTOLIC BLOOD PRESSURE: 139 MMHG | TEMPERATURE: 98 F | RESPIRATION RATE: 21 BRPM | HEIGHT: 69 IN | WEIGHT: 249 LBS | BODY MASS INDEX: 36.88 KG/M2

## 2022-07-19 DIAGNOSIS — I10 HYPERTENSION, UNSPECIFIED TYPE: Primary | ICD-10-CM

## 2022-07-19 DIAGNOSIS — R42 LIGHTHEADEDNESS: ICD-10-CM

## 2022-07-19 LAB
ALBUMIN SERPL-MCNC: 3.5 G/DL (ref 3.4–5)
ALBUMIN/GLOB SERPL: 1 {RATIO} (ref 1–2)
ALP LIVER SERPL-CCNC: 89 U/L
ALT SERPL-CCNC: 22 U/L
ANION GAP SERPL CALC-SCNC: 3 MMOL/L (ref 0–18)
AST SERPL-CCNC: 16 U/L (ref 15–37)
BASOPHILS # BLD AUTO: 0.05 X10(3) UL (ref 0–0.2)
BASOPHILS NFR BLD AUTO: 0.4 %
BILIRUB SERPL-MCNC: 0.7 MG/DL (ref 0.1–2)
BUN BLD-MCNC: 22 MG/DL (ref 7–18)
CALCIUM BLD-MCNC: 9.3 MG/DL (ref 8.5–10.1)
CHLORIDE SERPL-SCNC: 101 MMOL/L (ref 98–112)
CO2 SERPL-SCNC: 31 MMOL/L (ref 21–32)
CREAT BLD-MCNC: 1.09 MG/DL
EOSINOPHIL # BLD AUTO: 0.18 X10(3) UL (ref 0–0.7)
EOSINOPHIL NFR BLD AUTO: 1.5 %
ERYTHROCYTE [DISTWIDTH] IN BLOOD BY AUTOMATED COUNT: 18.4 %
GLOBULIN PLAS-MCNC: 3.5 G/DL (ref 2.8–4.4)
GLUCOSE BLD-MCNC: 228 MG/DL (ref 70–99)
HCT VFR BLD AUTO: 51.6 %
HGB BLD-MCNC: 16 G/DL
IMM GRANULOCYTES # BLD AUTO: 0.06 X10(3) UL (ref 0–1)
IMM GRANULOCYTES NFR BLD: 0.5 %
LYMPHOCYTES # BLD AUTO: 1.89 X10(3) UL (ref 1–4)
LYMPHOCYTES NFR BLD AUTO: 16.2 %
MCH RBC QN AUTO: 24.9 PG (ref 26–34)
MCHC RBC AUTO-ENTMCNC: 31 G/DL (ref 31–37)
MCV RBC AUTO: 80.4 FL
MONOCYTES # BLD AUTO: 0.92 X10(3) UL (ref 0.1–1)
MONOCYTES NFR BLD AUTO: 7.9 %
NEUTROPHILS # BLD AUTO: 8.6 X10 (3) UL (ref 1.5–7.7)
NEUTROPHILS # BLD AUTO: 8.6 X10(3) UL (ref 1.5–7.7)
NEUTROPHILS NFR BLD AUTO: 73.5 %
OSMOLALITY SERPL CALC.SUM OF ELEC: 291 MOSM/KG (ref 275–295)
PLATELET # BLD AUTO: 172 10(3)UL (ref 150–450)
POTASSIUM SERPL-SCNC: 4.1 MMOL/L (ref 3.5–5.1)
PROT SERPL-MCNC: 7 G/DL (ref 6.4–8.2)
RBC # BLD AUTO: 6.42 X10(6)UL
SARS-COV-2 RNA RESP QL NAA+PROBE: NOT DETECTED
SODIUM SERPL-SCNC: 135 MMOL/L (ref 136–145)
TROPONIN I HIGH SENSITIVITY: 7 NG/L
WBC # BLD AUTO: 11.7 X10(3) UL (ref 4–11)

## 2022-07-19 PROCEDURE — 71045 X-RAY EXAM CHEST 1 VIEW: CPT | Performed by: EMERGENCY MEDICINE

## 2022-07-19 PROCEDURE — 84484 ASSAY OF TROPONIN QUANT: CPT | Performed by: EMERGENCY MEDICINE

## 2022-07-19 PROCEDURE — 70450 CT HEAD/BRAIN W/O DYE: CPT | Performed by: EMERGENCY MEDICINE

## 2022-07-19 PROCEDURE — 93005 ELECTROCARDIOGRAM TRACING: CPT

## 2022-07-19 PROCEDURE — 36415 COLL VENOUS BLD VENIPUNCTURE: CPT

## 2022-07-19 PROCEDURE — 85025 COMPLETE CBC W/AUTO DIFF WBC: CPT | Performed by: EMERGENCY MEDICINE

## 2022-07-19 PROCEDURE — 93010 ELECTROCARDIOGRAM REPORT: CPT

## 2022-07-19 PROCEDURE — 99284 EMERGENCY DEPT VISIT MOD MDM: CPT

## 2022-07-19 PROCEDURE — 80053 COMPREHEN METABOLIC PANEL: CPT | Performed by: EMERGENCY MEDICINE

## 2022-07-19 RX ORDER — LORAZEPAM 1 MG/1
0.5 TABLET ORAL ONCE
Status: COMPLETED | OUTPATIENT
Start: 2022-07-19 | End: 2022-07-19

## 2022-07-19 RX ORDER — HYDRALAZINE HYDROCHLORIDE 50 MG/1
50 TABLET, FILM COATED ORAL ONCE
Status: COMPLETED | OUTPATIENT
Start: 2022-07-19 | End: 2022-07-19

## 2022-07-19 RX ORDER — HYDRALAZINE HYDROCHLORIDE 50 MG/1
50 TABLET, FILM COATED ORAL EVERY 8 HOURS SCHEDULED
Status: DISCONTINUED | OUTPATIENT
Start: 2022-07-19 | End: 2022-07-19

## 2022-07-19 NOTE — ED INITIAL ASSESSMENT (HPI)
Patient presents to the ED after visit with his Cardiologist. He states that they just changed blood pressure medications. He states that he has been having issues with the dizziness for the last few weeks.

## 2022-07-20 LAB
ATRIAL RATE: 73 BPM
P AXIS: 62 DEGREES
P-R INTERVAL: 200 MS
Q-T INTERVAL: 388 MS
QRS DURATION: 104 MS
QTC CALCULATION (BEZET): 427 MS
R AXIS: 54 DEGREES
T AXIS: 51 DEGREES
VENTRICULAR RATE: 73 BPM

## 2022-11-30 ENCOUNTER — HOSPITAL ENCOUNTER (EMERGENCY)
Age: 72
Discharge: HOME | End: 2022-11-30
Attending: EMERGENCY MEDICINE
Payer: MEDICARE

## 2022-11-30 VITALS
OXYGEN SATURATION: 99 % | RESPIRATION RATE: 18 BRPM | SYSTOLIC BLOOD PRESSURE: 163 MMHG | BODY MASS INDEX: 35.55 KG/M2 | TEMPERATURE: 99 F | WEIGHT: 240 LBS | HEART RATE: 77 BPM | DIASTOLIC BLOOD PRESSURE: 81 MMHG | HEIGHT: 69 IN

## 2022-11-30 DIAGNOSIS — M54.40 BACK PAIN OF LUMBAR REGION WITH SCIATICA: Primary | ICD-10-CM

## 2022-11-30 DIAGNOSIS — M25.559 HIP PAIN: ICD-10-CM

## 2022-11-30 LAB
POCT INFLUENZA A: NEGATIVE
POCT INFLUENZA B: NEGATIVE
SARS-COV-2 RNA RESP QL NAA+PROBE: NOT DETECTED

## 2022-11-30 PROCEDURE — 99283 EMERGENCY DEPT VISIT LOW MDM: CPT | Performed by: EMERGENCY MEDICINE

## 2022-11-30 PROCEDURE — 87502 INFLUENZA DNA AMP PROBE: CPT | Performed by: EMERGENCY MEDICINE

## 2022-11-30 PROCEDURE — 87502 INFLUENZA DNA AMP PROBE: CPT

## 2022-11-30 PROCEDURE — 99283 EMERGENCY DEPT VISIT LOW MDM: CPT

## 2022-11-30 RX ORDER — HYDROCODONE BITARTRATE AND ACETAMINOPHEN 7.5; 325 MG/1; MG/1
1-2 TABLET ORAL EVERY 6 HOURS PRN
Qty: 10 TABLET | Refills: 0 | Status: SHIPPED | OUTPATIENT
Start: 2022-11-30 | End: 2022-12-05

## 2022-11-30 RX ORDER — ACETAMINOPHEN 500 MG
1000 TABLET ORAL ONCE
Status: COMPLETED | OUTPATIENT
Start: 2022-11-30 | End: 2022-11-30

## 2022-11-30 RX ORDER — GABAPENTIN 100 MG/1
100 CAPSULE ORAL 3 TIMES DAILY
Qty: 45 CAPSULE | Refills: 0 | Status: SHIPPED | OUTPATIENT
Start: 2022-11-30 | End: 2022-12-15

## 2023-01-12 ENCOUNTER — APPOINTMENT (OUTPATIENT)
Dept: GENERAL RADIOLOGY | Age: 73
End: 2023-01-12
Attending: EMERGENCY MEDICINE
Payer: MEDICARE

## 2023-01-12 ENCOUNTER — APPOINTMENT (OUTPATIENT)
Dept: MRI IMAGING | Age: 73
End: 2023-01-12
Attending: EMERGENCY MEDICINE
Payer: MEDICARE

## 2023-01-12 ENCOUNTER — HOSPITAL ENCOUNTER (OUTPATIENT)
Facility: HOSPITAL | Age: 73
Setting detail: OBSERVATION
Discharge: HOME | End: 2023-01-13
Attending: EMERGENCY MEDICINE | Admitting: HOSPITALIST
Payer: MEDICARE

## 2023-01-12 DIAGNOSIS — R06.09 DYSPNEA ON EXERTION: ICD-10-CM

## 2023-01-12 DIAGNOSIS — R26.81 UNSTEADY GAIT: Primary | ICD-10-CM

## 2023-01-12 LAB
ALBUMIN SERPL-MCNC: 3.8 G/DL (ref 3.4–5)
ALBUMIN/GLOB SERPL: 1.2 {RATIO} (ref 1–2)
ALP LIVER SERPL-CCNC: 87 U/L
ALT SERPL-CCNC: 35 U/L
ANION GAP SERPL CALC-SCNC: 4 MMOL/L (ref 0–18)
AST SERPL-CCNC: 20 U/L (ref 15–37)
ATRIAL RATE: 70 BPM
BASOPHILS # BLD AUTO: 0.03 X10(3) UL (ref 0–0.2)
BASOPHILS NFR BLD AUTO: 0.4 %
BILIRUB SERPL-MCNC: 1 MG/DL (ref 0.1–2)
BILIRUB UR QL STRIP.AUTO: NEGATIVE
BUN BLD-MCNC: 27 MG/DL (ref 7–18)
CALCIUM BLD-MCNC: 8.9 MG/DL (ref 8.5–10.1)
CHLORIDE SERPL-SCNC: 101 MMOL/L (ref 98–112)
CLARITY UR REFRACT.AUTO: CLEAR
CO2 SERPL-SCNC: 30 MMOL/L (ref 21–32)
COLOR UR AUTO: YELLOW
CREAT BLD-MCNC: 1.07 MG/DL
EOSINOPHIL # BLD AUTO: 0.1 X10(3) UL (ref 0–0.7)
EOSINOPHIL NFR BLD AUTO: 1.2 %
ERYTHROCYTE [DISTWIDTH] IN BLOOD BY AUTOMATED COUNT: 19.5 %
EST. AVERAGE GLUCOSE BLD GHB EST-MCNC: 174 MG/DL (ref 68–126)
GFR SERPLBLD BASED ON 1.73 SQ M-ARVRAT: 73 ML/MIN/1.73M2 (ref 60–?)
GLOBULIN PLAS-MCNC: 3.1 G/DL (ref 2.8–4.4)
GLUCOSE BLD-MCNC: 127 MG/DL (ref 70–99)
GLUCOSE BLD-MCNC: 176 MG/DL (ref 70–99)
GLUCOSE BLD-MCNC: 191 MG/DL (ref 70–99)
GLUCOSE UR STRIP.AUTO-MCNC: NEGATIVE MG/DL
HBA1C MFR BLD: 7.7 % (ref ?–5.7)
HCT VFR BLD AUTO: 53.1 %
HGB BLD-MCNC: 16.3 G/DL
IMM GRANULOCYTES # BLD AUTO: 0.03 X10(3) UL (ref 0–1)
IMM GRANULOCYTES NFR BLD: 0.4 %
KETONES UR STRIP.AUTO-MCNC: NEGATIVE MG/DL
LEUKOCYTE ESTERASE UR QL STRIP.AUTO: NEGATIVE
LYMPHOCYTES # BLD AUTO: 1.43 X10(3) UL (ref 1–4)
LYMPHOCYTES NFR BLD AUTO: 17.7 %
MCH RBC QN AUTO: 24 PG (ref 26–34)
MCHC RBC AUTO-ENTMCNC: 30.7 G/DL (ref 31–37)
MCV RBC AUTO: 78.1 FL
MONOCYTES # BLD AUTO: 0.77 X10(3) UL (ref 0.1–1)
MONOCYTES NFR BLD AUTO: 9.5 %
NEUTROPHILS # BLD AUTO: 5.71 X10 (3) UL (ref 1.5–7.7)
NEUTROPHILS # BLD AUTO: 5.71 X10(3) UL (ref 1.5–7.7)
NEUTROPHILS NFR BLD AUTO: 70.8 %
NITRITE UR QL STRIP.AUTO: NEGATIVE
NT-PROBNP SERPL-MCNC: 207 PG/ML (ref ?–125)
OSMOLALITY SERPL CALC.SUM OF ELEC: 290 MOSM/KG (ref 275–295)
P AXIS: 52 DEGREES
P-R INTERVAL: 194 MS
PH UR STRIP.AUTO: 7 [PH] (ref 5–8)
PLATELET # BLD AUTO: 213 10(3)UL (ref 150–450)
POTASSIUM SERPL-SCNC: 4.4 MMOL/L (ref 3.5–5.1)
PROT SERPL-MCNC: 6.9 G/DL (ref 6.4–8.2)
Q-T INTERVAL: 418 MS
QRS DURATION: 132 MS
QTC CALCULATION (BEZET): 451 MS
R AXIS: 41 DEGREES
RBC # BLD AUTO: 6.8 X10(6)UL
RBC UR QL AUTO: NEGATIVE
SARS-COV-2 RNA RESP QL NAA+PROBE: NOT DETECTED
SODIUM SERPL-SCNC: 135 MMOL/L (ref 136–145)
SP GR UR STRIP.AUTO: 1.01 (ref 1–1.03)
T AXIS: 20 DEGREES
TROPONIN I HIGH SENSITIVITY: 11 NG/L
UROBILINOGEN UR STRIP.AUTO-MCNC: 0.2 MG/DL
VENTRICULAR RATE: 70 BPM
WBC # BLD AUTO: 8.1 X10(3) UL (ref 4–11)

## 2023-01-12 PROCEDURE — 99223 1ST HOSP IP/OBS HIGH 75: CPT | Performed by: HOSPITALIST

## 2023-01-12 PROCEDURE — 71045 X-RAY EXAM CHEST 1 VIEW: CPT | Performed by: EMERGENCY MEDICINE

## 2023-01-12 PROCEDURE — 70551 MRI BRAIN STEM W/O DYE: CPT | Performed by: EMERGENCY MEDICINE

## 2023-01-12 RX ORDER — ALLOPURINOL 300 MG/1
300 TABLET ORAL DAILY
Status: DISCONTINUED | OUTPATIENT
Start: 2023-01-13 | End: 2023-01-13

## 2023-01-12 RX ORDER — LORAZEPAM 2 MG/ML
0.5 INJECTION INTRAMUSCULAR ONCE
Status: COMPLETED | OUTPATIENT
Start: 2023-01-12 | End: 2023-01-12

## 2023-01-12 RX ORDER — ONDANSETRON 2 MG/ML
4 INJECTION INTRAMUSCULAR; INTRAVENOUS EVERY 6 HOURS PRN
Status: DISCONTINUED | OUTPATIENT
Start: 2023-01-12 | End: 2023-01-13

## 2023-01-12 RX ORDER — ATORVASTATIN CALCIUM 40 MG/1
40 TABLET, FILM COATED ORAL NIGHTLY
Status: DISCONTINUED | OUTPATIENT
Start: 2023-01-12 | End: 2023-01-13

## 2023-01-12 RX ORDER — DEXTROSE MONOHYDRATE 25 G/50ML
50 INJECTION, SOLUTION INTRAVENOUS
Status: DISCONTINUED | OUTPATIENT
Start: 2023-01-12 | End: 2023-01-13

## 2023-01-12 RX ORDER — PANTOPRAZOLE SODIUM 20 MG/1
20 TABLET, DELAYED RELEASE ORAL
Status: DISCONTINUED | OUTPATIENT
Start: 2023-01-13 | End: 2023-01-13

## 2023-01-12 RX ORDER — HYDRALAZINE HYDROCHLORIDE 50 MG/1
50 TABLET, FILM COATED ORAL 2 TIMES DAILY
Status: DISCONTINUED | OUTPATIENT
Start: 2023-01-12 | End: 2023-01-13

## 2023-01-12 RX ORDER — ASPIRIN 81 MG/1
81 TABLET ORAL DAILY
Status: DISCONTINUED | OUTPATIENT
Start: 2023-01-12 | End: 2023-01-13

## 2023-01-12 RX ORDER — NICOTINE POLACRILEX 4 MG
15 LOZENGE BUCCAL
Status: DISCONTINUED | OUTPATIENT
Start: 2023-01-12 | End: 2023-01-13

## 2023-01-12 RX ORDER — NICOTINE POLACRILEX 4 MG
30 LOZENGE BUCCAL
Status: DISCONTINUED | OUTPATIENT
Start: 2023-01-12 | End: 2023-01-13

## 2023-01-12 RX ORDER — CLOPIDOGREL BISULFATE 75 MG/1
75 TABLET ORAL DAILY
Status: DISCONTINUED | OUTPATIENT
Start: 2023-01-12 | End: 2023-01-13

## 2023-01-12 RX ORDER — ENOXAPARIN SODIUM 100 MG/ML
40 INJECTION SUBCUTANEOUS DAILY
Status: DISCONTINUED | OUTPATIENT
Start: 2023-01-13 | End: 2023-01-13

## 2023-01-12 NOTE — ED INITIAL ASSESSMENT (HPI)
Pt states for the last 2 weeks feeling dizzy, denies nausea or chest pain, states had BP med changed in novmeber but doesn't remember what

## 2023-01-13 ENCOUNTER — APPOINTMENT (OUTPATIENT)
Dept: CV DIAGNOSTICS | Facility: HOSPITAL | Age: 73
End: 2023-01-13
Attending: NURSE PRACTITIONER
Payer: MEDICARE

## 2023-01-13 VITALS
OXYGEN SATURATION: 95 % | WEIGHT: 235 LBS | RESPIRATION RATE: 20 BRPM | HEIGHT: 69 IN | DIASTOLIC BLOOD PRESSURE: 85 MMHG | BODY MASS INDEX: 34.8 KG/M2 | TEMPERATURE: 98 F | SYSTOLIC BLOOD PRESSURE: 150 MMHG | HEART RATE: 67 BPM

## 2023-01-13 LAB
GLUCOSE BLD-MCNC: 131 MG/DL (ref 70–99)
GLUCOSE BLD-MCNC: 187 MG/DL (ref 70–99)

## 2023-01-13 PROCEDURE — 93306 TTE W/DOPPLER COMPLETE: CPT | Performed by: NURSE PRACTITIONER

## 2023-01-13 PROCEDURE — 99239 HOSP IP/OBS DSCHRG MGMT >30: CPT | Performed by: INTERNAL MEDICINE

## 2023-01-13 RX ORDER — CELECOXIB 200 MG/1
200 CAPSULE ORAL DAILY
COMMUNITY

## 2023-01-13 RX ORDER — LISINOPRIL 2.5 MG/1
2.5 TABLET ORAL DAILY
Qty: 30 TABLET | Refills: 2 | Status: SHIPPED | OUTPATIENT
Start: 2023-01-14

## 2023-01-13 RX ORDER — INSULIN GLARGINE 300 U/ML
30 INJECTION, SOLUTION SUBCUTANEOUS DAILY
COMMUNITY
End: 2023-01-13

## 2023-01-13 RX ORDER — ATENOLOL 25 MG/1
25 TABLET ORAL DAILY
COMMUNITY

## 2023-01-13 RX ORDER — BUDESONIDE AND FORMOTEROL FUMARATE DIHYDRATE 80; 4.5 UG/1; UG/1
1 AEROSOL RESPIRATORY (INHALATION) 2 TIMES DAILY
COMMUNITY

## 2023-01-13 RX ORDER — HYDRALAZINE HYDROCHLORIDE 50 MG/1
50 TABLET, FILM COATED ORAL EVERY 8 HOURS SCHEDULED
Status: DISCONTINUED | OUTPATIENT
Start: 2023-01-13 | End: 2023-01-13

## 2023-01-13 RX ORDER — TAMSULOSIN HYDROCHLORIDE 0.4 MG/1
0.4 CAPSULE ORAL NIGHTLY
COMMUNITY

## 2023-01-13 RX ORDER — ESOMEPRAZOLE MAGNESIUM 40 MG/1
40 CAPSULE, DELAYED RELEASE ORAL
COMMUNITY

## 2023-01-13 RX ORDER — ALPRAZOLAM 0.5 MG/1
0.5 TABLET ORAL 2 TIMES DAILY PRN
COMMUNITY

## 2023-01-13 RX ORDER — LISINOPRIL 2.5 MG/1
2.5 TABLET ORAL DAILY
Status: DISCONTINUED | OUTPATIENT
Start: 2023-01-13 | End: 2023-01-13

## 2023-01-13 NOTE — CDS QUERY
Kobi Pastor  Dear Dr. Patel Captain:  Clinical information (provided below) includes documentation of a diagnosis of hyponatremia with an elevated Glucose Value. For accurate ICD-10-CM code assignment to reflect severity of illness and risk of mortality,  Based upon the clinical indicators below, please clarify the diagnosis related to the low sodium value    ( x ) Pseudohyponatremia  (  ) Hyponatremia is evident because (please provide additional clinical information from the medical record supportive of the diagnosis). _____________________________________________________________  (  ) Other (please specify):     Documentation from the Medical Record:     Possible Risk Factors: DM type 2  Clinical Indicators: Na on admission 135, glucose 191. Corrected Na for elevated glucose: 137  Treatment:                   If you have any questions, please contact Clinical : Chetna Lara RN [phone]  Bonnie Cushing. [email or web address]  THIS FORM IS A PERMANENT PART OF THE MEDICAL RECORD

## 2023-01-13 NOTE — PLAN OF CARE
NURSING DISCHARGE NOTE    Discharged Home via Wheelchair. Accompanied by Support staff  Belongings Taken by patient/family      Assumed care of patient at 0730  A&ox4, room air, NSR on tele, VSS    No complaints of dizziness or lightheadedness  Plan to discharge home after echo; echo completed and read, okay to dc from cardiology stand point. All MD's aware of discharge plan; all discharge paperwork, follow up appointments, and medications discussed with patient and family. Family and patient verbalize understanding of discharge plan. No further questions at this time. Tele removed, IV removed. Patient belongings packed and sent home with patient. Patient discharged home via wheelchair.        Problem: Diabetes/Glucose Control  Goal: Glucose maintained within prescribed range  Description: INTERVENTIONS:  - Monitor Blood Glucose as ordered  - Assess for signs and symptoms of hyperglycemia and hypoglycemia  - Administer ordered medications to maintain glucose within target range  - Assess barriers to adequate nutritional intake and initiate nutrition consult as needed  - Instruct patient on self management of diabetes  Outcome: Adequate for Discharge     Problem: Patient/Family Goals  Goal: Patient/Family Long Term Goal  Description: Patient's Long Term Goal:     Interventions:  -   - See additional Care Plan goals for specific interventions  Outcome: Adequate for Discharge  Goal: Patient/Family Short Term Goal  Description: Patient's Short Term Goal:    Interventions:   -   - See additional Care Plan goals for specific interventions  Outcome: Adequate for Discharge

## 2023-01-13 NOTE — PROGRESS NOTES
Admitted patient from [facility] with  C/o dizziness and lightheadedness   Patient alert x 4   Oriented to surroundings   Safety precautions initiated   Instructed on call light use   Tele and  applied   Vitals stable sinus on tele   Completed navigation   Room air, wears c-pap at night   Pt updated on poc and verbalizes understanding   Will continue to monitor pt

## 2023-02-20 ENCOUNTER — HOSPITAL ENCOUNTER (EMERGENCY)
Age: 73
Discharge: LEFT BEFORE DISCHARGE | End: 2023-02-20
Payer: MEDICARE

## 2023-02-20 ENCOUNTER — OFFICE VISIT (OUTPATIENT)
Dept: FAMILY MEDICINE CLINIC | Facility: CLINIC | Age: 73
End: 2023-02-20
Payer: MEDICARE

## 2023-02-20 VITALS
BODY MASS INDEX: 34.8 KG/M2 | WEIGHT: 235 LBS | SYSTOLIC BLOOD PRESSURE: 145 MMHG | HEIGHT: 69 IN | TEMPERATURE: 98 F | HEART RATE: 96 BPM | RESPIRATION RATE: 16 BRPM | DIASTOLIC BLOOD PRESSURE: 84 MMHG | OXYGEN SATURATION: 97 %

## 2023-02-20 DIAGNOSIS — Z20.822 ENCOUNTER FOR SCREENING LABORATORY TESTING FOR COVID-19 VIRUS: Primary | ICD-10-CM

## 2023-02-20 DIAGNOSIS — E11.69 HYPERLIPIDEMIA ASSOCIATED WITH TYPE 2 DIABETES MELLITUS (HCC): ICD-10-CM

## 2023-02-20 DIAGNOSIS — E78.5 HYPERLIPIDEMIA ASSOCIATED WITH TYPE 2 DIABETES MELLITUS (HCC): ICD-10-CM

## 2023-02-20 PROBLEM — J41.0 SMOKERS' COUGH (HCC): Chronic | Status: ACTIVE | Noted: 2023-02-20

## 2023-02-21 LAB — SARS-COV-2 RNA RESP QL NAA+PROBE: DETECTED

## 2023-04-11 ENCOUNTER — HOSPITAL ENCOUNTER (OUTPATIENT)
Dept: LAB | Facility: HOSPITAL | Age: 73
Discharge: HOME | End: 2023-04-11
Attending: NURSE PRACTITIONER
Payer: MEDICARE

## 2023-04-11 LAB — D DIMER PPP FEU-MCNC: 1.78 UG/ML FEU (ref ?–0.74)

## 2023-04-11 PROCEDURE — 36415 COLL VENOUS BLD VENIPUNCTURE: CPT | Performed by: NURSE PRACTITIONER

## 2023-04-11 PROCEDURE — 85379 FIBRIN DEGRADATION QUANT: CPT | Performed by: NURSE PRACTITIONER

## 2023-04-14 ENCOUNTER — HOSPITAL ENCOUNTER (OUTPATIENT)
Dept: CT IMAGING | Facility: HOSPITAL | Age: 73
Discharge: HOME | End: 2023-04-14
Attending: NURSE PRACTITIONER
Payer: MEDICARE

## 2023-04-14 DIAGNOSIS — I25.10 CAD (CORONARY ARTERY DISEASE): ICD-10-CM

## 2023-04-14 DIAGNOSIS — R06.09 DOE (DYSPNEA ON EXERTION): ICD-10-CM

## 2023-04-14 DIAGNOSIS — R79.89 D-DIMER, ELEVATED: ICD-10-CM

## 2023-04-14 LAB
CREAT BLD-MCNC: 1.3 MG/DL
GFR SERPLBLD BASED ON 1.73 SQ M-ARVRAT: 58 ML/MIN/1.73M2 (ref 60–?)

## 2023-04-14 PROCEDURE — 71260 CT THORAX DX C+: CPT | Performed by: NURSE PRACTITIONER

## 2023-04-14 PROCEDURE — 82565 ASSAY OF CREATININE: CPT

## 2023-09-28 ENCOUNTER — LAB (OUTPATIENT)
Dept: LAB | Facility: CLINIC | Age: 73
End: 2023-09-28
Payer: MEDICARE

## 2023-09-28 ENCOUNTER — OFFICE VISIT (OUTPATIENT)
Dept: URGENT CARE | Facility: CLINIC | Age: 73
End: 2023-09-28
Payer: MEDICARE

## 2023-09-28 VITALS
HEART RATE: 66 BPM | DIASTOLIC BLOOD PRESSURE: 87 MMHG | WEIGHT: 208 LBS | RESPIRATION RATE: 18 BRPM | SYSTOLIC BLOOD PRESSURE: 157 MMHG | TEMPERATURE: 97.8 F

## 2023-09-28 DIAGNOSIS — L02.416 ABSCESS OF LEFT KNEE: ICD-10-CM

## 2023-09-28 DIAGNOSIS — T84.84XS PAIN DUE TO TOTAL LEFT KNEE REPLACEMENT, SEQUELA: Primary | ICD-10-CM

## 2023-09-28 DIAGNOSIS — Z96.652 PAIN DUE TO TOTAL LEFT KNEE REPLACEMENT, SEQUELA: Primary | ICD-10-CM

## 2023-09-28 PROBLEM — I21.9 MYOCARDIAL INFARCTION (CMS/HCC): Status: ACTIVE | Noted: 2023-09-28

## 2023-09-28 PROBLEM — M47.816 DJD (DEGENERATIVE JOINT DISEASE), LUMBAR: Status: ACTIVE | Noted: 2020-03-27

## 2023-09-28 PROBLEM — I77.9 PERIPHERAL ARTERIAL OCCLUSIVE DISEASE (CMS/HCC): Status: ACTIVE | Noted: 2023-09-28

## 2023-09-28 PROBLEM — R26.9 ABNORMAL GAIT: Status: ACTIVE | Noted: 2023-01-12

## 2023-09-28 PROBLEM — E78.5 OTHER AND UNSPECIFIED HYPERLIPIDEMIA: Status: ACTIVE | Noted: 2023-09-28

## 2023-09-28 PROBLEM — F43.10 POST-TRAUMATIC STRESS DISORDER, UNSPECIFIED: Status: ACTIVE | Noted: 2023-09-28

## 2023-09-28 PROBLEM — T81.49XA WOUND INFECTION AFTER SURGERY: Status: ACTIVE | Noted: 2023-06-08

## 2023-09-28 PROBLEM — M25.50 ARTHRALGIA: Status: ACTIVE | Noted: 2017-12-07

## 2023-09-28 LAB
ALBUMIN SERPL-MCNC: 4.1 G/DL (ref 3.5–5.3)
ALP SERPL-CCNC: 90 U/L (ref 45–115)
ALT SERPL-CCNC: 12 U/L (ref 7–52)
ANION GAP SERPL CALC-SCNC: 7 MMOL/L (ref 3–11)
ANISOCYTOSIS PRESENCE IN BLOOD, ANALYZER: ABNORMAL
AST SERPL-CCNC: 10 U/L
BASOPHILS # BLD AUTO: 0.1 10*3/UL
BASOPHILS NFR BLD AUTO: 0.7 % (ref 0–2)
BILIRUB SERPL-MCNC: 0.36 MG/DL (ref 0.2–1.4)
BUN SERPL-MCNC: 33 MG/DL (ref 7–25)
CALCIUM ALBUM COR SERPL-MCNC: 9.3 MG/DL (ref 8.6–10.3)
CALCIUM SERPL-MCNC: 9.4 MG/DL (ref 8.6–10.3)
CHLORIDE SERPL-SCNC: 103 MMOL/L (ref 98–107)
CO2 SERPL-SCNC: 28 MMOL/L (ref 21–32)
CREAT SERPL-MCNC: 0.65 MG/DL (ref 0.7–1.3)
CRP SERPL-MCNC: 34.4 MG/L
EGFRCR SERPLBLD CKD-EPI 2021: 99 ML/MIN/1.73M*2
EOSINOPHIL # BLD AUTO: 0.2 10*3/UL
EOSINOPHIL NFR BLD AUTO: 2.1 % (ref 0–3)
ERYTHROCYTE [DISTWIDTH] IN BLOOD BY AUTOMATED COUNT: 17.5 % (ref 11.5–15)
ERYTHROCYTE [SEDIMENTATION RATE] IN BLOOD: 13 MM/HR
GLUCOSE SERPL-MCNC: 129 MG/DL (ref 70–105)
HCT VFR BLD AUTO: 41.7 % (ref 38–50)
HGB BLD-MCNC: 13.6 G/DL (ref 13.2–17.2)
HYPOCHROMIA PRESENCE IN BLOOD, ANALYZER: ABNORMAL
LYMPHOCYTES # BLD AUTO: 1.5 10*3/UL
LYMPHOCYTES NFR BLD AUTO: 16.8 % (ref 15–47)
MCH RBC QN AUTO: 26.4 PG (ref 29–34)
MCHC RBC AUTO-ENTMCNC: 32.5 G/DL (ref 32–36)
MCV RBC AUTO: 81.3 FL (ref 82–97)
MONOCYTES # BLD AUTO: 0.8 10*3/UL
MONOCYTES NFR BLD AUTO: 9 % (ref 5–13)
NEUTROPHILS # BLD AUTO: 6.2 10*3/UL
NEUTROPHILS NFR BLD AUTO: 71.4 % (ref 46–70)
PLATELET # BLD AUTO: 232 10*3/UL (ref 130–350)
PMV BLD AUTO: 7.4 FL (ref 6.9–10.8)
POTASSIUM SERPL-SCNC: 4 MMOL/L (ref 3.5–5.1)
PROT SERPL-MCNC: 6.7 G/DL (ref 6–8.3)
RBC # BLD AUTO: 5.13 10*6/ΜL (ref 4.1–5.8)
SODIUM SERPL-SCNC: 138 MMOL/L (ref 135–145)
WBC # BLD AUTO: 8.7 10*3/UL (ref 3.7–9.6)

## 2023-09-28 PROCEDURE — 87075 CULTR BACTERIA EXCEPT BLOOD: CPT | Performed by: NURSE PRACTITIONER

## 2023-09-28 PROCEDURE — 86140 C-REACTIVE PROTEIN: CPT | Performed by: NURSE PRACTITIONER

## 2023-09-28 PROCEDURE — 80053 COMPREHEN METABOLIC PANEL: CPT | Performed by: NURSE PRACTITIONER

## 2023-09-28 PROCEDURE — 85652 RBC SED RATE AUTOMATED: CPT | Performed by: NURSE PRACTITIONER

## 2023-09-28 PROCEDURE — G0463 HOSPITAL OUTPT CLINIC VISIT: HCPCS | Performed by: NURSE PRACTITIONER

## 2023-09-28 PROCEDURE — 87070 CULTURE OTHR SPECIMN AEROBIC: CPT | Performed by: NURSE PRACTITIONER

## 2023-09-28 PROCEDURE — 99204 OFFICE O/P NEW MOD 45 MIN: CPT | Performed by: NURSE PRACTITIONER

## 2023-09-28 PROCEDURE — 36415 COLL VENOUS BLD VENIPUNCTURE: CPT | Performed by: NURSE PRACTITIONER

## 2023-09-28 PROCEDURE — 85025 COMPLETE CBC W/AUTO DIFF WBC: CPT | Performed by: NURSE PRACTITIONER

## 2023-09-28 RX ORDER — CELECOXIB 200 MG/1
1 CAPSULE ORAL DAILY
COMMUNITY
Start: 2023-02-26 | End: 2024-12-15 | Stop reason: ALTCHOICE

## 2023-09-28 RX ORDER — GLIPIZIDE 5 MG/1
5 TABLET ORAL
COMMUNITY
Start: 2023-06-10

## 2023-09-28 RX ORDER — FUROSEMIDE 20 MG/1
20 TABLET ORAL 2 TIMES DAILY
COMMUNITY
Start: 2023-02-26 | End: 2024-12-15 | Stop reason: ALTCHOICE

## 2023-09-28 RX ORDER — AMOXICILLIN AND CLAVULANATE POTASSIUM 875; 125 MG/1; MG/1
1 TABLET, FILM COATED ORAL 2 TIMES DAILY
COMMUNITY
Start: 2023-09-20 | End: 2024-12-15 | Stop reason: ALTCHOICE

## 2023-09-28 RX ORDER — CARVEDILOL 12.5 MG/1
1 TABLET ORAL 2 TIMES DAILY WITH MEALS
COMMUNITY
Start: 2023-01-18

## 2023-09-28 RX ORDER — ASPIRIN 81 MG/1
81 TABLET ORAL DAILY
COMMUNITY

## 2023-09-28 RX ORDER — HYDRALAZINE HYDROCHLORIDE 50 MG/1
50 TABLET, FILM COATED ORAL 3 TIMES DAILY
COMMUNITY
Start: 2023-01-18

## 2023-09-28 RX ORDER — ALLOPURINOL 300 MG/1
1 TABLET ORAL DAILY
COMMUNITY
Start: 2023-02-26

## 2023-09-28 RX ORDER — ATORVASTATIN CALCIUM 40 MG/1
1 TABLET, FILM COATED ORAL DAILY
COMMUNITY
Start: 2023-02-26

## 2023-09-28 RX ORDER — GABAPENTIN 300 MG/1
1 CAPSULE ORAL NIGHTLY
COMMUNITY
Start: 2023-04-17

## 2023-09-28 ASSESSMENT — ENCOUNTER SYMPTOMS
VOMITING: 0
ARTHRALGIAS: 0
RHINORRHEA: 0
WEAKNESS: 0
NAUSEA: 0
DIARRHEA: 0
COUGH: 0
FEVER: 0
LIGHT-HEADEDNESS: 0
MYALGIAS: 0
ABDOMINAL PAIN: 0
DIZZINESS: 0
JOINT SWELLING: 1
WHEEZING: 0
SORE THROAT: 0
SINUS PAIN: 0
CHILLS: 0
TROUBLE SWALLOWING: 0
CHEST TIGHTNESS: 0
EYE PAIN: 0
NERVOUS/ANXIOUS: 0
DIFFICULTY URINATING: 0
SINUS PRESSURE: 0
CONSTIPATION: 0
PALPITATIONS: 0
HEADACHES: 0
SHORTNESS OF BREATH: 0
FREQUENCY: 0
AGITATION: 0

## 2023-09-28 ASSESSMENT — PAIN SCALES - GENERAL: PAINLEVEL: 8

## 2023-09-28 NOTE — PATIENT INSTRUCTIONS
Continue with current oral antibiotics.  Contact your orthopedic surgeon and infectious disease doctor immediately.  You are encouraged to return to [place] as soon as possible for further follow-up and treatment.  We will notify you as soon as culture results are available.

## 2023-09-28 NOTE — PROGRESS NOTES
"Subjective      Dannie Hastings is a 72 y.o. male who presents for knee pain and area of swelling.    Patient presents today with complaints of pain in his right knee as well as an area of swelling on the lateral aspect of the knee.  Patient has a long surgical history with this knee.  Apparently he underwent a right Uni knee surgical intervention on 2/4/2014 at [facility].  He then had a revision to a total knee on 3/8/2023.  Unfortunately he ended up with an infection in that knee and had to have a single-stage revision with I&D on 4/25/2023 with sinus tract and excision and closure.  Patient then required an I&D with complex wound closure on 5/4/2023 with cultures growing Enterococcus faecalis.  He required 6 weeks of IV Zosyn from 4/27/2023 to 6/5/2023 and was transition to oral Augmentin on 6/7/2023.  Records from infectious disease say that the patient was admitted to [facility] on 6/9/2023 due to complications and did have IV ampicillin for 6 weeks and was then again transition to oral Augmentin.  He continues to follow with his infectious disease as well as his orthopedic surgeon there.  There are notes stating that the patient has been discussing a possible two-stage revision to transition off of chronic suppression.  Patient states that he discussed the swelling in his knee and sent a picture of that area of abscess to his providers this week however they stated to \"stay the course\"        The following have been reviewed and updated as appropriate in this visit:   Allergies  Meds  Problems  Med Hx  Surg Hx  Fam Hx         Allergies   Allergen Reactions    Clonidine     Vibramycin [Doxycycline Calcium] Other (see comments)     Current Outpatient Medications   Medication Sig Dispense Refill    allopurinoL (ZYLOPRIM) 300 mg tablet Take 1 tablet (300 mg total) by mouth daily      atorvastatin (LIPITOR) 40 mg tablet Take 1 tablet (40 mg total) by mouth daily      celecoxib " (CeleBREX) 200 mg capsule Take 1 capsule (200 mg total) by mouth daily      furosemide (LASIX) 20 mg tablet Take 1 tablet (20 mg total) by mouth 2 times daily      carvediloL (COREG) 12.5 mg tablet Take 1 tablet (12.5 mg total) by mouth 2 (two) times a day with meals      gabapentin (NEURONTIN) 300 mg capsule Take 1 capsule (300 mg total) by mouth nightly      glipiZIDE (GLUCOTROL) 5 mg tablet Take 1 tablet (5 mg total) by mouth      hydrALAZINE (APRESOLINE) 50 mg tablet Take 1 tablet (50 mg total) by mouth 3 times daily      aspirin 81 mg EC tablet Take 1 tablet (81 mg total) by mouth daily      amoxicillin-pot clavulanate (AUGMENTIN) 875-125 mg per tablet Take 1 tablet by mouth 2 (two) times a day       No current facility-administered medications for this visit.     History reviewed. No pertinent past medical history.  History reviewed. No pertinent surgical history.  History reviewed. No pertinent family history.  Social History     Socioeconomic History    Marital status:        Review of Systems   Constitutional:  Negative for chills and fever.   HENT:  Negative for congestion, ear pain, rhinorrhea, sinus pressure, sinus pain, sore throat and trouble swallowing.    Eyes:  Negative for pain and visual disturbance.   Respiratory:  Negative for cough, chest tightness, shortness of breath and wheezing.    Cardiovascular:  Negative for chest pain and palpitations.   Gastrointestinal:  Negative for abdominal pain, constipation, diarrhea, nausea and vomiting.   Endocrine: Negative for cold intolerance and heat intolerance.   Genitourinary:  Negative for difficulty urinating, frequency and urgency.   Musculoskeletal:  Positive for gait problem (utilizes cane) and joint swelling. Negative for arthralgias and myalgias.   Skin:  Negative for rash. Wound: patient with multiple scars to right knee.       Area of swelling and drainage on right lateral knee   Neurological:  Negative for dizziness, weakness,  light-headedness and headaches.   Psychiatric/Behavioral:  Negative for agitation. The patient is not nervous/anxious.          Objective   /87 (BP Location: Left arm, Patient Position: Sitting, Cuff Size: Long Adult)   Pulse 66   Temp 36.6 °C (97.8 °F) (Temporal)   Resp 18   Wt 94.3 kg (208 lb) Comment: Protein diet sincce January, off for surgery, lost 62 #  BP Readings from Last 3 Encounters:   09/28/23 157/87      Wt Readings from Last 3 Encounters:   09/28/23 94.3 kg (208 lb)      Pulse Readings from Last 3 Encounters:   09/28/23 66      Resp Readings from Last 3 Encounters:   09/28/23 18       Physical Exam  Vitals and nursing note reviewed.   Constitutional:       Appearance: Normal appearance. He is normal weight.   HENT:      Head: Normocephalic and atraumatic.      Right Ear: External ear normal.      Left Ear: External ear normal.      Nose: Nose normal.      Mouth/Throat:      Mouth: Mucous membranes are moist.   Eyes:      Extraocular Movements: Extraocular movements intact.      Pupils: Pupils are equal, round, and reactive to light.   Cardiovascular:      Rate and Rhythm: Normal rate and regular rhythm.      Pulses: Normal pulses.      Heart sounds: Normal heart sounds.   Pulmonary:      Effort: Pulmonary effort is normal.      Breath sounds: Normal breath sounds.   Abdominal:      General: Abdomen is flat. Bowel sounds are normal.      Palpations: Abdomen is soft.   Musculoskeletal:      Cervical back: Normal range of motion and neck supple.      Right knee: Swelling present. Tenderness present over the lateral joint line.        Legs:       Comments: Patient utilizing wheel chair today.  He is able to stand and use quad cane to get to the bathroom.    Lymphadenopathy:      Cervical: No cervical adenopathy.   Skin:     General: Skin is warm and dry.      Capillary Refill: Capillary refill takes less than 2 seconds.   Neurological:      General: No focal deficit present.      Mental Status:  He is alert and oriented to person, place, and time.   Psychiatric:         Mood and Affect: Mood normal.         Behavior: Behavior normal.         Right knee abscess area is cleaned with chlorhexidine swab and using sterile technique 18-gauge needle is used to aspirate 5 mL of purulent drainage from area.  Sterile dressing is applied following procedure.  Fluid is sent for culture.  Patient tolerated the aspiration without difficulty.    Recent Results (from the past 24 hour(s))   CBC w/auto differential Blood, Venous    Collection Time: 09/28/23  4:01 PM   Result Value Ref Range    WBC 8.7 3.7 - 9.6 10*3/uL    RBC 5.13 4.10 - 5.80 10*6/µL    Hemoglobin 13.6 13.2 - 17.2 g/dL    Hematocrit 41.7 38.0 - 50.0 %    MCV 81.3 (L) 82.0 - 97.0 fL    MCH 26.4 (L) 29.0 - 34.0 pg    MCHC 32.5 32.0 - 36.0 g/dL    RDW 17.5 (H) 11.5 - 15.0 %    Platelets 232 130 - 350 10*3/uL    MPV 7.4 6.9 - 10.8 fL    Neutrophils% 71.4 (H) 46.0 - 70.0 %    Lymphocytes% 16.8 15.0 - 47.0 %    Monocytes% 9.0 5.0 - 13.0 %    Eosinophils% 2.1 0.0 - 3.0 %    Basophils% 0.7 0.0 - 2.0 %    ANC (auto diff) 6.20 10*3/UL    Lymphocytes Absolute 1.50 10*3/uL    Monocytes Absolute 0.80 10*3/uL    Eosinophils Absolute 0.20 10*3/uL    Basophils Absolute 0.10 10*3/uL    Anisocytosis 1+     Hypochromia 1+    Comprehensive metabolic panel Blood, Venous    Collection Time: 09/28/23  4:01 PM   Result Value Ref Range    Sodium 138 135 - 145 mmol/L    Potassium 4.0 3.5 - 5.1 MMOL/L    Chloride 103 98 - 107 mmol/L    CO2 28 21 - 32 mmol/L    Anion Gap 7 3 - 11 mmol/L    BUN 33 (H) 7 - 25 mg/dL    Creatinine 0.65 (L) 0.70 - 1.30 mg/dL    Glucose 129 (H) 70 - 105 mg/dL    Calcium 9.4 8.6 - 10.3 mg/dL    AST 10 <40 U/L    ALT (SGPT) 12 7 - 52 U/L    Alkaline Phosphatase 90 45 - 115 U/L    Total Protein 6.7 6.0 - 8.3 g/dL    Albumin 4.1 3.5 - 5.3 g/dL    Total Bilirubin 0.36 0.20 - 1.40 mg/dL    Corrected Calcium 9.3 8.6 - 10.3 mg/dL    eGFR 99 >60 mL/min/1.73m*2    C-reactive protein (Inflammation) Blood, Venous    Collection Time: 09/28/23  4:01 PM   Result Value Ref Range    CRP 34.4 (H) <=10.0 MG/L       Assessment/Plan   Diagnoses and all orders for this visit:    Pain due to total left knee replacement, sequela  -     CBC w/auto differential Blood, Venous  -     Comprehensive metabolic panel Blood, Venous  -     C-reactive protein (Inflammation) Blood, Venous  -     Sedimentation rate, automated Blood, Venous    Abscess of left knee  -     Anaerobic culture  -     Misc Body fluid culture w/ stain    Patient's records are reviewed from [place] and case is reviewed with Dr. Cuevas today.  The patient's CRP has elevated from his recent labs from 17.8 to 34.4.  White blood cell count remains normal.  Sed rate was also ordered however I do not have results today.  We discussed the possibility of draining the abscess on the lateral knee.  Patient is currently on Augmentin chronic suppression.  He also requested his knee be tapped.  I am uncomfortable doing this due to patient's longstanding history.  Plan will be to aspirate the small abscess on the lateral knee patient will continue with his current antibiotic therapy and he is encouraged to follow-up with his primary providers in [place] as soon as possible.  Aspiration did reveal purulent discharge with approximately 3 to 5 mL aspirated and sent for culture.    Patient Instructions   Continue with current oral antibiotics.  Contact your orthopedic surgeon and infectious disease doctor immediately.  You are encouraged to return to [place] as soon as possible for further follow-up and treatment.  We will notify you as soon as culture results are available.    Deirdre Fry, CNP

## 2023-10-02 LAB
BACTERIA ISLT CULT: NORMAL
BACTERIA ISLT CULT: NORMAL
GRAM STN SPEC: NORMAL
GRAM STN SPEC: NORMAL

## 2024-12-05 ENCOUNTER — APPOINTMENT (OUTPATIENT)
Dept: CT IMAGING | Age: 74
End: 2024-12-05
Attending: EMERGENCY MEDICINE
Payer: MEDICARE

## 2024-12-05 ENCOUNTER — HOSPITAL ENCOUNTER (EMERGENCY)
Age: 74
Discharge: HOME | End: 2024-12-05
Attending: EMERGENCY MEDICINE
Payer: MEDICARE

## 2024-12-05 VITALS
WEIGHT: 225 LBS | OXYGEN SATURATION: 98 % | SYSTOLIC BLOOD PRESSURE: 167 MMHG | TEMPERATURE: 98 F | RESPIRATION RATE: 17 BRPM | HEIGHT: 69 IN | DIASTOLIC BLOOD PRESSURE: 86 MMHG | HEART RATE: 98 BPM | BODY MASS INDEX: 33.33 KG/M2

## 2024-12-05 DIAGNOSIS — S05.11XA PERIORBITAL CONTUSION OF RIGHT EYE, INITIAL ENCOUNTER: ICD-10-CM

## 2024-12-05 DIAGNOSIS — S09.8XXA BLUNT HEAD INJURY, INITIAL ENCOUNTER: Primary | ICD-10-CM

## 2024-12-05 PROCEDURE — 70480 CT ORBIT/EAR/FOSSA W/O DYE: CPT | Performed by: EMERGENCY MEDICINE

## 2024-12-05 PROCEDURE — 99284 EMERGENCY DEPT VISIT MOD MDM: CPT

## 2024-12-05 PROCEDURE — 70450 CT HEAD/BRAIN W/O DYE: CPT | Performed by: EMERGENCY MEDICINE

## 2024-12-06 NOTE — ED PROVIDER NOTES
Patient Seen in: [facility] In [place]      History     Chief Complaint   Patient presents with    Fall     Patient fell on driveway. Positive thinners. Right eye swelling.     Stated Complaint: Patient fell onto driveway, positive thinners. RIght eye swelling.    Subjective:   HPI      73-year-old male with a past medical history as below including CAD on Plavix presents after he fell and hit his head.  Patient states he was walking and tripped on uneven pavers and fell hitting his right eyebrow area on the ground.  Daughter states that it immediately swelled up when she saw him immediately afterwards.  He denies LOC, nausea or vomiting.  Denies visual changes.  Denies neck or back pain.  Denies any other injury from the fall.    Objective:     Past Medical History:    CAD (coronary artery disease)    Coronary atherosclerosis    Extrinsic asthma, unspecified    Gout    Hearing impairment    High blood pressure    High cholesterol    Hyperlipidemia    Hyperlipidemia associated with type 2 diabetes mellitus (HCC)    Hypertension    Hypertension associated with diabetes (HCC)    Long-term insulin use (HCC)    Sleep apnea    Type II or unspecified type diabetes mellitus without mention of complication, not stated as uncontrolled    Uncontrolled type 2 diabetes with neuropathy    Visual impairment              No pertinent past surgical history.              No pertinent social history.                Physical Exam     ED Triage Vitals [12/05/24 1909]   BP (!) 199/105   Pulse 88   Resp 18   Temp 98.2 °F (36.8 °C)   Temp src Oral   SpO2 99 %   O2 Device        Current Vitals:   Vital Signs  BP: (!) 199/105  Pulse: 88  Resp: 18  Temp: 98.2 °F (36.8 °C)  Temp src: Oral    Oxygen Therapy  SpO2: 99 %        Physical Exam  Vitals and nursing note reviewed.   Constitutional:       General: He is not in acute distress.     Appearance: He is well-developed. He is not ill-appearing.   HENT:      Head:  Normocephalic.        Mouth/Throat:      Mouth: Mucous membranes are moist.   Eyes:      General: No scleral icterus.     Extraocular Movements: Extraocular movements intact.      Conjunctiva/sclera: Conjunctivae normal.      Pupils: Pupils are equal, round, and reactive to light.   Musculoskeletal:      Cervical back: Neck supple.   Skin:     General: Skin is warm and dry.      Capillary Refill: Capillary refill takes less than 2 seconds.   Neurological:      Mental Status: He is alert and oriented to person, place, and time.      GCS: GCS eye subscore is 4. GCS verbal subscore is 5. GCS motor subscore is 6.   Psychiatric:         Mood and Affect: Mood normal.         Behavior: Behavior normal.             ED Course   Labs Reviewed - No data to display         CT ORBITS (CPT=70480)    Result Date: 12/5/2024  CONCLUSION:   Large right periorbital hematoma.  No evidence of orbital or regional maxillofacial fracture.   LOCATION:  [facility]   Dictated by (CST): Darby Foley MD on 12/05/2024 at 8:08 PM     Finalized by (CST): Darby Foley MD on 12/05/2024 at 8:11 PM       CT BRAIN OR HEAD (CPT=70450)    Result Date: 12/5/2024  CONCLUSION:   1. Negative for acute intracranial process.  2. Large right periorbital soft tissue hematoma.  No intraorbital extension.    LOCATION:  [facility]   Dictated by (CST): Darby Foley MD on 12/05/2024 at 8:04 PM     Finalized by (CST): Darby Foley MD on 12/05/2024 at 8:07 PM             MDM      73-year-old male with a past medical history as below including CAD on Plavix presents after he fell and hit his head.     Differential includes but is not limited to ICH, periorbital contusion, orbital fracture    Independent interpretation of CT brain and orbits show no evidence of fracture or intracranial bleed, there is a periorbital hematoma.  Radiology report reviewed as above.    Instructed to ice the area well to reduce swelling.  Tylenol for pain.  Advise close follow-up with PCP.  Return  precaution discussed.    Medical Decision Making  Amount and/or Complexity of Data Reviewed  Independent Historian: caregiver     Details: See HPI  Radiology: ordered and independent interpretation performed. Decision-making details documented in ED Course.    Risk  OTC drugs.        Disposition and Plan     Clinical Impression:  1. Blunt head injury, initial encounter    2. Periorbital contusion of right eye, initial encounter         Disposition:  Discharge  12/5/2024  8:16 pm    Follow-up:  Mary Anne Mendoza, PARVE[place]  [address] [address] [facility] [address] [id number] [phone]    Schedule an appointment as soon as possible for a visit in 2 day(s)            Medications Prescribed:  Current Discharge Medication List              Supplementary Documentation:

## 2024-12-15 ENCOUNTER — HOSPITAL ENCOUNTER (OUTPATIENT)
Dept: CT IMAGING | Facility: HOSPITAL | Age: 74
Discharge: HOME | End: 2024-12-15
Payer: MEDICARE

## 2024-12-15 ENCOUNTER — OFFICE VISIT (OUTPATIENT)
Dept: URGENT CARE | Facility: CLINIC | Age: 74
End: 2024-12-15
Payer: MEDICARE

## 2024-12-15 VITALS
TEMPERATURE: 97.8 F | HEART RATE: 79 BPM | OXYGEN SATURATION: 91 % | SYSTOLIC BLOOD PRESSURE: 120 MMHG | WEIGHT: 225 LBS | DIASTOLIC BLOOD PRESSURE: 58 MMHG

## 2024-12-15 DIAGNOSIS — R42 DIZZINESS: Primary | ICD-10-CM

## 2024-12-15 DIAGNOSIS — R55 POSTURAL DIZZINESS WITH NEAR SYNCOPE: ICD-10-CM

## 2024-12-15 DIAGNOSIS — R42 POSTURAL DIZZINESS WITH NEAR SYNCOPE: ICD-10-CM

## 2024-12-15 PROCEDURE — G0463 HOSPITAL OUTPT CLINIC VISIT: HCPCS | Performed by: NURSE PRACTITIONER

## 2024-12-15 PROCEDURE — 99213 OFFICE O/P EST LOW 20 MIN: CPT | Performed by: NURSE PRACTITIONER

## 2024-12-15 PROCEDURE — 70450 CT HEAD/BRAIN W/O DYE: CPT

## 2024-12-15 PROCEDURE — 70450 CT HEAD/BRAIN W/O DYE: CPT | Mod: 26 | Performed by: INTERNAL MEDICINE

## 2024-12-15 RX ORDER — TORSEMIDE 20 MG/1
20 TABLET ORAL DAILY
COMMUNITY

## 2024-12-15 RX ORDER — TAMSULOSIN HYDROCHLORIDE 0.4 MG/1
1 CAPSULE ORAL EVERY 24 HOURS
COMMUNITY
Start: 2024-03-01

## 2024-12-15 RX ORDER — MELOXICAM 15 MG/1
15 TABLET ORAL DAILY
COMMUNITY
Start: 2024-11-12

## 2024-12-15 RX ORDER — POTASSIUM CHLORIDE 750 MG/1
10 TABLET, EXTENDED RELEASE ORAL DAILY
COMMUNITY

## 2024-12-15 ASSESSMENT — ENCOUNTER SYMPTOMS
FATIGUE: 0
SEIZURES: 0
LIGHT-HEADEDNESS: 1
CHILLS: 0
ACTIVITY CHANGE: 1
NECK STIFFNESS: 0
COUGH: 0
FEVER: 0
CHEST TIGHTNESS: 0
SHORTNESS OF BREATH: 0
VOMITING: 0
APPETITE CHANGE: 1
TREMORS: 0
WEAKNESS: 0
DIARRHEA: 0
ABDOMINAL PAIN: 0
DIZZINESS: 1
NECK PAIN: 0
SPEECH DIFFICULTY: 0
HEADACHES: 1
NAUSEA: 1
CHOKING: 0
BLOOD IN STOOL: 0

## 2024-12-15 NOTE — PROGRESS NOTES
Subjective   Chief Complaint   Patient presents with    Dizziness     Lightheadedness and feeling like he was going to pass out at the store today. Has fall 10 days ago and still hematoma and headache.      History of Present Illness  The patient, with a history of back injury and recent cataract surgery, presents with symptoms following a fall that resulted in a hematoma on the forehead. The patient reports feeling lightheaded and nauseated, with an episode of near syncope at the grocery store. They also report increased fatigue, sleeping 10-11 hours per night. The patient has been eating normally but reports occasional lack of appetite. The patient has noticed new visual changes since the fall, with episodes of difficulty focusing lasting about half an hour, occurring 2-3 times since the fall.  Diagnosed with postconcussive syndrome.  Advise decrease stimuli, decrease television time, no reading, no phone time.  Lowered lights in the room in order to allow the brain to rest and recover    The patient also reports new onset of heel pain, which is worse upon initial weight bearing and improves with walking. However, if they walk too far, they lose strength and can't stand up, which they attribute to their back injury. The patient also reports weakness in the legs, with a sensation of wanting to go down, which they also attribute to their back injury. They are scheduled for a spinal fusion surgery due to a herniated disc causing nerve compression.         The following have been reviewed and updated as appropriate in this visit:        Allergies   Allergen Reactions    Clonidine     Metformin     Vibramycin [Doxycycline Calcium] Other (see comments)     Current Outpatient Medications   Medication Sig Dispense Refill    allopurinoL (ZYLOPRIM) 300 mg tablet Take 1 tablet (300 mg total) by mouth daily      aspirin 81 mg EC tablet Take 1 tablet (81 mg total) by mouth daily      atorvastatin (LIPITOR) 40 mg tablet Take 1  tablet (40 mg total) by mouth daily      celecoxib (CeleBREX) 200 mg capsule Take 1 capsule (200 mg total) by mouth daily      furosemide (LASIX) 20 mg tablet Take 1 tablet (20 mg total) by mouth 2 times daily      carvediloL (COREG) 12.5 mg tablet Take 1 tablet (12.5 mg total) by mouth 2 (two) times a day with meals      gabapentin (NEURONTIN) 300 mg capsule Take 1 capsule (300 mg total) by mouth nightly      glipiZIDE (GLUCOTROL) 5 mg tablet Take 1 tablet (5 mg total) by mouth      hydrALAZINE (APRESOLINE) 50 mg tablet Take 1 tablet (50 mg total) by mouth 3 times daily       No current facility-administered medications for this visit.     No past medical history on file.    Review of Systems   Constitutional:  Positive for activity change and appetite change. Negative for chills, fatigue and fever.   HENT: Negative.     Respiratory:  Negative for cough, choking, chest tightness and shortness of breath.    Cardiovascular:  Negative for chest pain.   Gastrointestinal:  Positive for nausea. Negative for abdominal pain, blood in stool, diarrhea and vomiting.   Musculoskeletal:  Negative for neck pain and neck stiffness.   Neurological:  Positive for dizziness, light-headedness and headaches. Negative for tremors, seizures, syncope, speech difficulty and weakness.       Objective   Wt 102.1 kg (225 lb)     Physical Exam  Constitutional:       General: He is not in acute distress.     Appearance: Normal appearance. He is not ill-appearing or toxic-appearing.   HENT:      Head:      Comments: Face: Hematoma at right eyebrow, ecchymotic areas at right face, left face     Right Ear: Tympanic membrane, ear canal and external ear normal. There is no impacted cerumen.      Left Ear: Tympanic membrane, ear canal and external ear normal. There is no impacted cerumen.   Cardiovascular:      Rate and Rhythm: Normal rate and regular rhythm.      Heart sounds: Normal heart sounds. No murmur heard.  Pulmonary:      Effort: Pulmonary  effort is normal. No respiratory distress.      Breath sounds: Normal breath sounds.   Skin:     General: Skin is warm and dry.      Findings: Bruising present.   Neurological:      General: No focal deficit present.      Mental Status: He is alert.   Psychiatric:         Mood and Affect: Mood normal.       No results found for this or any previous visit (from the past 24 hours).    Results        Assessment/Plan   There are no diagnoses linked to this encounter.  Assessment/Plan       Post-concussive syndrome Recent fall with head trauma resulting in a hematoma. No internal bleeding or brain bleeding noted on initial CT scan. Patient reports persistent fatigue, intermittent blurry vision, and a near syncope episode.   -Order repeat CT scan today to reassess for any evolving intracranial pathology.  CT today reports no acute intracranial processes    Plantar Fasciitis New onset heel pain, worse on initial weight bearing, improving with continued walking. -Advise stretching exercises before walking. -Consider follow-up with a podiatrist for further management if symptoms persist.     Lumbar disc herniation Chronic back pain with recent worsening of symptoms including leg weakness and numbness. Scheduled for spinal fusion surgery on January 7th. -Continue with planned surgery. -Follow-up postoperatively to assess for improvement in symptoms.          There are no Patient Instructions on file for this visit.    Abbie Law, CNP

## 2024-12-30 ENCOUNTER — HOSPITAL ENCOUNTER (OUTPATIENT)
Dept: GENERAL RADIOLOGY | Age: 74
Discharge: HOME | End: 2024-12-30
Attending: NURSE PRACTITIONER
Payer: MEDICARE

## 2024-12-30 ENCOUNTER — OFFICE VISIT (OUTPATIENT)
Dept: FAMILY MEDICINE CLINIC | Facility: CLINIC | Age: 74
End: 2024-12-30
Payer: MEDICARE

## 2024-12-30 VITALS
OXYGEN SATURATION: 96 % | WEIGHT: 225 LBS | SYSTOLIC BLOOD PRESSURE: 163 MMHG | HEART RATE: 64 BPM | HEIGHT: 69 IN | TEMPERATURE: 99 F | DIASTOLIC BLOOD PRESSURE: 76 MMHG | BODY MASS INDEX: 33.33 KG/M2 | RESPIRATION RATE: 16 BRPM

## 2024-12-30 DIAGNOSIS — R05.1 ACUTE COUGH: ICD-10-CM

## 2024-12-30 DIAGNOSIS — Z11.52 ENCOUNTER FOR SCREENING FOR COVID-19: ICD-10-CM

## 2024-12-30 DIAGNOSIS — J18.9 PNEUMONIA OF LEFT LOWER LOBE DUE TO INFECTIOUS ORGANISM: Primary | ICD-10-CM

## 2024-12-30 LAB
OPERATOR ID: NORMAL
POCT LOT NUMBER: NORMAL
RAPID SARS-COV-2 BY PCR: NOT DETECTED

## 2024-12-30 PROCEDURE — 71046 X-RAY EXAM CHEST 2 VIEWS: CPT | Performed by: NURSE PRACTITIONER

## 2024-12-30 PROCEDURE — 1160F RVW MEDS BY RX/DR IN RCRD: CPT | Performed by: NURSE PRACTITIONER

## 2024-12-30 PROCEDURE — 3078F DIAST BP <80 MM HG: CPT | Performed by: NURSE PRACTITIONER

## 2024-12-30 PROCEDURE — 87637 SARSCOV2&INF A&B&RSV AMP PRB: CPT | Performed by: NURSE PRACTITIONER

## 2024-12-30 PROCEDURE — 1159F MED LIST DOCD IN RCRD: CPT | Performed by: NURSE PRACTITIONER

## 2024-12-30 PROCEDURE — 99213 OFFICE O/P EST LOW 20 MIN: CPT | Performed by: NURSE PRACTITIONER

## 2024-12-30 PROCEDURE — U0002 COVID-19 LAB TEST NON-CDC: HCPCS | Performed by: NURSE PRACTITIONER

## 2024-12-30 PROCEDURE — 3077F SYST BP >= 140 MM HG: CPT | Performed by: NURSE PRACTITIONER

## 2024-12-30 PROCEDURE — 3008F BODY MASS INDEX DOCD: CPT | Performed by: NURSE PRACTITIONER

## 2024-12-30 RX ORDER — AMOXICILLIN 500 MG/1
1000 TABLET, FILM COATED ORAL 3 TIMES DAILY
Qty: 30 TABLET | Refills: 0 | Status: SHIPPED | OUTPATIENT
Start: 2024-12-30 | End: 2025-01-04

## 2024-12-30 RX ORDER — AZITHROMYCIN 250 MG/1
TABLET, FILM COATED ORAL
Qty: 6 TABLET | Refills: 0 | Status: SHIPPED | OUTPATIENT
Start: 2024-12-30 | End: 2025-01-04

## 2024-12-30 RX ORDER — ALBUTEROL SULFATE 90 UG/1
2 INHALANT RESPIRATORY (INHALATION) EVERY 4 HOURS PRN
Qty: 1 EACH | Refills: 0 | Status: SHIPPED | OUTPATIENT
Start: 2024-12-30 | End: 2025-01-13

## 2024-12-30 RX ORDER — BENZONATATE 200 MG/1
200 CAPSULE ORAL 3 TIMES DAILY PRN
Qty: 20 CAPSULE | Refills: 0 | Status: SHIPPED | OUTPATIENT
Start: 2024-12-30 | End: 2025-01-06

## 2024-12-30 NOTE — PROGRESS NOTES
CHIEF COMPLAINT:     Chief Complaint   Patient presents with    Cough     Cough. For 8 days  ( no other symptoms)  OTC: Nyquill        HPI:   Luis Eduardo Briggs is a 74 year old male who presents for upper respiratory symptoms for  1 weeks. Patient reports  wet cough . Symptoms have been consistent since onset.  Treating symptoms with OTC Nyquil.      Current Outpatient Medications   Medication Sig Dispense Refill    albuterol 108 (90 Base) MCG/ACT Inhalation Aero Soln Inhale 2 puffs into the lungs every 4 (four) hours as needed for Wheezing or Shortness of Breath. 1 each 0    benzonatate 200 MG Oral Cap Take 1 capsule (200 mg total) by mouth 3 (three) times daily as needed. 20 capsule 0    azithromycin (ZITHROMAX Z-SOLE) 250 MG Oral Tab Take 2 tablets (500 mg total) by mouth daily for 1 day, THEN 1 tablet (250 mg total) daily for 4 days. 6 tablet 0    amoxicillin 500 MG Oral Tab Take 2 tablets (1,000 mg total) by mouth 3 (three) times daily for 5 days. 30 tablet 0    Continuous Glucose Sensor (DEXCOM G6 SENSOR) Does not apply Misc USE AS DIRECTED W/ TRANSMITTER. REPLACE EVERY 10 DAYS      ALPRAZolam 0.5 MG Oral Tab Take 0.5 mg by mouth 2 (two) times daily as needed.      atenolol 25 MG Oral Tab Take 25 mg by mouth daily.      tamsulosin 0.4 MG Oral Cap Take 0.4 mg by mouth at bedtime.      Budesonide-Formoterol Fumarate 80-4.5 MCG/ACT Inhalation Aerosol Inhale 1 puff into the lungs 2 (two) times daily.      celecoxib 200 MG Oral Cap Take 200 mg by mouth daily.      Esomeprazole Magnesium 40 MG Oral Capsule Delayed Release Take 40 mg by mouth 2 (two) times daily before meals.      lisinopril 2.5 MG Oral Tab Take 1 tablet (2.5 mg total) by mouth daily. 30 tablet 2    hydrALAzine HCl 50 MG Oral Tab Take 50 mg by mouth 2 (two) times daily.      HUMALOG KWIKPEN 100 UNIT/ML Subcutaneous Solution Pen-injector       Multiple Vitamins-Minerals (CENTRUM ULTRA MENS) Oral Tab Take by mouth.      furosemide 40 MG Oral Tab Take 40 mg  by mouth 2 (two) times daily.      Potassium Chloride ER 10 MEQ Oral Tab CR Take 10 mEq by mouth daily.      Testosterone cypionate 200 MG/ML Intramuscular Solution Inject 150 mg into the muscle once a week. On .  150 mg=0.75 ml      aspirin 81 MG Oral Tab Take 1 tablet (81 mg total) by mouth daily. 30 tablet 0    Atorvastatin Calcium 40 MG Oral Tab Take 1 tablet (40 mg total) by mouth nightly. 30 tablet 6    allopurinol 300 MG Oral Tab Take 300 mg by mouth daily.      Clopidogrel Bisulfate 75 MG Oral Tab Take 75 mg by mouth daily.        Past Medical History:    CAD (coronary artery disease)    Coronary atherosclerosis    Extrinsic asthma, unspecified    Gout    Hearing impairment    High blood pressure    High cholesterol    Hyperlipidemia    Hyperlipidemia associated with type 2 diabetes mellitus (HCC)    Hypertension    Hypertension associated with diabetes (HCC)    Long-term insulin use (HCC)    Sleep apnea    Type II or unspecified type diabetes mellitus without mention of complication, not stated as uncontrolled    Uncontrolled type 2 diabetes with neuropathy    Visual impairment      Past Surgical History:   Procedure Laterality Date    Angioplasty (coronary)      Stent 2012    Appendectomy      Cath drug eluting stent      Colonoscopy      Knee replacement surgery      Other surgical history  2012    stent heart    Total knee replacement      partial-right         Social History     Socioeconomic History    Marital status:    Tobacco Use    Smoking status: Former     Current packs/day: 0.00     Average packs/day: 1.5 packs/day for 21.0 years (31.5 ttl pk-yrs)     Types: Cigarettes     Start date: 1965     Quit date: 1986     Years since quittin.7    Smokeless tobacco: Former   Vaping Use    Vaping status: Never Used   Substance and Sexual Activity    Alcohol use: Yes     Alcohol/week: 12.0 standard drinks of alcohol     Types: 12 Cans of beer per week     Comment:   bottle vodka/week    Drug use: No     Social Drivers of Health     Financial Resource Strain: Low Risk  (5/16/2024)    Received from [facility]    Overall Financial Resource Strain (CARDIA)     Difficulty of Paying Living Expenses: Not hard at all   Food Insecurity: No Food Insecurity (5/16/2024)    Received from [facility]    Hunger Vital Sign     Worried About Running Out of Food in the Last Year: Never true     Ran Out of Food in the Last Year: Never true   Transportation Needs: No Transportation Needs (5/16/2024)    Received from [facility]    PRAPARE - Transportation     Lack of Transportation (Medical): No     Lack of Transportation (Non-Medical): No   Physical Activity: Inactive (8/25/2020)    Received from [facility]    Exercise Vital Sign     Days of Exercise per Week: 0 days     Minutes of Exercise per Session: 0 min   Housing Stability: Low Risk  (5/16/2024)    Received from [facility]    Housing Stability Vital Sign     Unable to Pay for Housing in the Last Year: No     Number of Places Lived in the Last Year: 1     Unstable Housing in the Last Year: No         REVIEW OF SYSTEMS:   GENERAL: no change in appetite  SKIN: no rashes or abnormal skin lesions  HEENT: See HPI  LUNGS: See HPI  CARDIOVASCULAR: denies chest pain or palpitations   GI: denies N/V/C or abdominal pain      EXAM:   BP (!) 163/76   Pulse 64   Temp 99.2 °F (37.3 °C)   Resp 16   Ht 5' 9\" (1.753 m)   Wt 225 lb (102.1 kg)   SpO2 96%   BMI 33.23 kg/m²   GENERAL: well developed, well nourished,in no apparent distress  SKIN: no rashes,no suspicious lesions  HEAD: atraumatic, normocephalic.  no tenderness on palpation of sinuses  EYES: conjunctiva clear, EOM intact  EARS: TM's without erythema, no bulging, no retraction,no fluid, bony landmarks visible  NOSE: Nostrils patent, clear nasal discharge, nasal mucosa not inflamed   THROAT: Oral  mucosa pink, moist. Posterior pharynx is not erythematous. no exudates.     NECK: Supple, non-tender  LUNGS: clear to auscultation bilaterally, scattered rhonchi, no wheezing.  + wet cough.  Breathing is non labored.  CARDIO: RRR without murmur  EXTREMITIES: no cyanosis, clubbing or edema  LYMPH:  no cervical lymphadenopathy.      PROCEDURE:  XR CHEST PA + LAT CHEST (CPT=71046)     INDICATIONS:  R05.1 Acute cough     COMPARISON:  [facility], XR, XR CHEST AP PORTABLE  (CPT=71045), 1/12/2023, 2:40 PM.     TECHNIQUE:  PA and lateral chest radiographs were obtained.     PATIENT STATED HISTORY: (As transcribed by Technologist)  Pt c/o very productive coughing x 8-9 days.   Hx of a cardiac stent.         FINDINGS:    LUNGS:  Left infrahilar ill-defined opacity is suspicious for left lower lobe pneumonia.  Remainder of lungs is clear.    CARDIAC:  Normal size cardiac silhouette.  MEDIASTINUM:  Aorta is atherosclerotic.  PLEURA:  Normal.  No pleural effusions.  BONES:  Normal for age.                   Impression   CONCLUSION:  Left infrahilar consolidation is consistent with left lower lobe pneumonia.        LOCATION:  [facility]        Dictated by (CST): Jeremie Kim MD on 12/30/2024 at 2:57 PM      Finalized by (CST): Jeremie Kim MD on 12/30/2024 at 2:58 PM         ASSESSMENT AND PLAN:   Luis Eduardo Briggs is a 74 year old male who presents with upper respiratory symptoms that are consistent with    ASSESSMENT:   Encounter Diagnoses   Name Primary?    Acute cough     Encounter for screening for COVID-19     Pneumonia of left lower lobe due to infectious organism Yes       PLAN:  + pneumonia.  Meds as below.  Comfort care as described in Patient Instructions.  ER for worsening symptoms, otherwise follow up with PCP in 3 days.     Meds & Refills for this Visit:  Requested Prescriptions     Signed Prescriptions Disp Refills    albuterol 108 (90 Base) MCG/ACT Inhalation Aero Soln 1 each 0     Sig: Inhale 2 puffs into the lungs  every 4 (four) hours as needed for Wheezing or Shortness of Breath.    benzonatate 200 MG Oral Cap 20 capsule 0     Sig: Take 1 capsule (200 mg total) by mouth 3 (three) times daily as needed.    azithromycin (ZITHROMAX Z-SOLE) 250 MG Oral Tab 6 tablet 0     Sig: Take 2 tablets (500 mg total) by mouth daily for 1 day, THEN 1 tablet (250 mg total) daily for 4 days.    amoxicillin 500 MG Oral Tab 30 tablet 0     Sig: Take 2 tablets (1,000 mg total) by mouth 3 (three) times daily for 5 days.     Risks, benefits, and side effects of medication explained and discussed.    The patient indicates understanding of these issues and agrees to the plan.  The patient is asked to f/u with PCP if sx's persist or worsen.  Patient Instructions   Follow up with PCP in 3 days  Go to ER for any new or worsening symptoms

## 2024-12-31 LAB
FLUAV + FLUBV RNA SPEC NAA+PROBE: NOT DETECTED
FLUAV + FLUBV RNA SPEC NAA+PROBE: NOT DETECTED
RSV RNA SPEC NAA+PROBE: NOT DETECTED
SARS-COV-2 RNA RESP QL NAA+PROBE: NOT DETECTED

## 2025-01-07 ENCOUNTER — OFFICE VISIT (OUTPATIENT)
Dept: FAMILY MEDICINE CLINIC | Facility: CLINIC | Age: 75
End: 2025-01-07
Payer: MEDICARE

## 2025-01-07 ENCOUNTER — HOSPITAL ENCOUNTER (OUTPATIENT)
Dept: GENERAL RADIOLOGY | Age: 75
Discharge: HOME | End: 2025-01-07
Attending: NURSE PRACTITIONER
Payer: MEDICARE

## 2025-01-07 VITALS
SYSTOLIC BLOOD PRESSURE: 138 MMHG | DIASTOLIC BLOOD PRESSURE: 90 MMHG | HEART RATE: 70 BPM | RESPIRATION RATE: 18 BRPM | WEIGHT: 225 LBS | BODY MASS INDEX: 33.33 KG/M2 | TEMPERATURE: 98 F | OXYGEN SATURATION: 97 % | HEIGHT: 69 IN

## 2025-01-07 DIAGNOSIS — J18.9 PNEUMONIA OF LEFT LOWER LOBE DUE TO INFECTIOUS ORGANISM: Primary | ICD-10-CM

## 2025-01-07 DIAGNOSIS — R05.8 COUGH WITH SPUTUM: ICD-10-CM

## 2025-01-07 DIAGNOSIS — R05.3 PERSISTENT COUGH: ICD-10-CM

## 2025-01-07 PROCEDURE — 99214 OFFICE O/P EST MOD 30 MIN: CPT | Performed by: NURSE PRACTITIONER

## 2025-01-07 PROCEDURE — 3080F DIAST BP >= 90 MM HG: CPT | Performed by: NURSE PRACTITIONER

## 2025-01-07 PROCEDURE — 3075F SYST BP GE 130 - 139MM HG: CPT | Performed by: NURSE PRACTITIONER

## 2025-01-07 PROCEDURE — 3008F BODY MASS INDEX DOCD: CPT | Performed by: NURSE PRACTITIONER

## 2025-01-07 PROCEDURE — 71046 X-RAY EXAM CHEST 2 VIEWS: CPT | Performed by: NURSE PRACTITIONER

## 2025-01-07 PROCEDURE — 1160F RVW MEDS BY RX/DR IN RCRD: CPT | Performed by: NURSE PRACTITIONER

## 2025-01-07 PROCEDURE — 1159F MED LIST DOCD IN RCRD: CPT | Performed by: NURSE PRACTITIONER

## 2025-01-07 RX ORDER — MOXIFLOXACIN HYDROCHLORIDE 400 MG/1
400 TABLET ORAL DAILY
Qty: 7 TABLET | Refills: 0 | Status: SHIPPED | OUTPATIENT
Start: 2025-01-07 | End: 2025-01-14

## 2025-01-08 NOTE — PROGRESS NOTES
CHIEF COMPLAINT:     Chief Complaint   Patient presents with    Cough     +Pneumonia on 12/30/24  Prescrived Benzo, Albuterol, Zpak, Amox, completed Amox and Zpak   Still c/o cough        HPI:   Luis Eduardo Briggs is a 74 year old male who presents for upper respiratory symptoms for  2 weeks.  He was diagnosed with LLL Pneumonia on 12/30/24, treated with Zithromax and Amoxicillin. He returns today with persistent symptoms and increase in yellow sputum. He denies being SOB. He denies fevers.   Symptoms have been persisting since onset.  Treating symptoms with none.  Denies SOB, loss of taste or smell. Denies n/v/d.    Current Outpatient Medications   Medication Sig Dispense Refill    Moxifloxacin HCl 400 MG Oral Tab Take 1 tablet (400 mg total) by mouth daily for 7 days. 7 tablet 0    albuterol 108 (90 Base) MCG/ACT Inhalation Aero Soln Inhale 2 puffs into the lungs every 4 (four) hours as needed for Wheezing or Shortness of Breath. 1 each 0    ALPRAZolam 0.5 MG Oral Tab Take 0.5 mg by mouth 2 (two) times daily as needed.      atenolol 25 MG Oral Tab Take 25 mg by mouth daily.      tamsulosin 0.4 MG Oral Cap Take 0.4 mg by mouth at bedtime.      Budesonide-Formoterol Fumarate 80-4.5 MCG/ACT Inhalation Aerosol Inhale 1 puff into the lungs 2 (two) times daily.      celecoxib 200 MG Oral Cap Take 200 mg by mouth daily.      Esomeprazole Magnesium 40 MG Oral Capsule Delayed Release Take 40 mg by mouth 2 (two) times daily before meals.      lisinopril 2.5 MG Oral Tab Take 1 tablet (2.5 mg total) by mouth daily. 30 tablet 2    hydrALAzine HCl 50 MG Oral Tab Take 50 mg by mouth 2 (two) times daily.      HUMALOG KWIKPEN 100 UNIT/ML Subcutaneous Solution Pen-injector       Multiple Vitamins-Minerals (CENTRUM ULTRA MENS) Oral Tab Take by mouth.      furosemide 40 MG Oral Tab Take 40 mg by mouth 2 (two) times daily.      Potassium Chloride ER 10 MEQ Oral Tab CR Take 10 mEq by mouth daily.      Testosterone cypionate 200 MG/ML  Intramuscular Solution Inject 150 mg into the muscle once a week. On .  150 mg=0.75 ml      aspirin 81 MG Oral Tab Take 1 tablet (81 mg total) by mouth daily. 30 tablet 0    Atorvastatin Calcium 40 MG Oral Tab Take 1 tablet (40 mg total) by mouth nightly. 30 tablet 6    allopurinol 300 MG Oral Tab Take 300 mg by mouth daily.      Clopidogrel Bisulfate 75 MG Oral Tab Take 75 mg by mouth daily.        Past Medical History:    CAD (coronary artery disease)    Coronary atherosclerosis    Extrinsic asthma, unspecified    Gout    Hearing impairment    High blood pressure    High cholesterol    Hyperlipidemia    Hyperlipidemia associated with type 2 diabetes mellitus (HCC)    Hypertension    Hypertension associated with diabetes (HCC)    Long-term insulin use (HCC)    Sleep apnea    Type II or unspecified type diabetes mellitus without mention of complication, not stated as uncontrolled    Uncontrolled type 2 diabetes with neuropathy    Visual impairment      Past Surgical History:   Procedure Laterality Date    Angioplasty (coronary)      Stent 2012    Appendectomy      Cath drug eluting stent      Colonoscopy      Knee replacement surgery      Other surgical history  2012    stent heart    Total knee replacement      partial-right         Social History     Socioeconomic History    Marital status:    Tobacco Use    Smoking status: Former     Current packs/day: 0.00     Average packs/day: 1.5 packs/day for 21.0 years (31.5 ttl pk-yrs)     Types: Cigarettes     Start date: 1965     Quit date: 1986     Years since quittin.7    Smokeless tobacco: Former   Vaping Use    Vaping status: Never Used   Substance and Sexual Activity    Alcohol use: Yes     Alcohol/week: 12.0 standard drinks of alcohol     Types: 12 Cans of beer per week     Comment: 1/2 bottle vodka/week    Drug use: No     Social Drivers of Health     Financial Resource Strain: Low Risk  (2024)    Received from [facility]    Overall Financial Resource Strain (CARDIA)     Difficulty of Paying Living Expenses: Not hard at all   Food Insecurity: No Food Insecurity (5/16/2024)    Received from [facility]    Hunger Vital Sign     Worried About Running Out of Food in the Last Year: Never true     Ran Out of Food in the Last Year: Never true   Transportation Needs: No Transportation Needs (5/16/2024)    Received from [facility]    PRAPARE - Transportation     Lack of Transportation (Medical): No     Lack of Transportation (Non-Medical): No   Physical Activity: Inactive (8/25/2020)    Received from [facility]    Exercise Vital Sign     Days of Exercise per Week: 0 days     Minutes of Exercise per Session: 0 min   Housing Stability: Low Risk  (5/16/2024)    Received from [facility]    Housing Stability Vital Sign     Unable to Pay for Housing in the Last Year: No     Number of Places Lived in the Last Year: 1     Unstable Housing in the Last Year: No         REVIEW OF SYSTEMS:   GENERAL: no change in appetite  SKIN: no rashes or abnormal skin lesions  HEENT: See HPI  LUNGS: See HPI  CARDIOVASCULAR: denies chest pain or palpitations   GI: denies N/V/C or abdominal pain      EXAM:   /90   Pulse 70   Temp 98.2 °F (36.8 °C) (Oral)   Resp 18   Ht 5' 9\" (1.753 m)   Wt 225 lb (102.1 kg)   SpO2 97%   BMI 33.23 kg/m²   GENERAL: well developed, well nourished,in no apparent distress  SKIN: no rashes,no suspicious lesions  HEAD: atraumatic, normocephalic.    EYES: conjunctiva clear, EOM intact  EARS: TM's bilaterally non-erythematous, no bulging, no retraction, no fluid, bony landmarks clearly visualized  NOSE: Nostrils patent, no nasal discharge, nasal mucosa pink and non-inflamed   THROAT: Oral mucosa pink, moist. Posterior pharynx is not erythematous. No exudates. Tonsils 2/4.    NECK: Supple, non-tender  LUNGS: clear to auscultation  bilaterally, no wheezes or rhonchi. Breathing is non labored.  CARDIO: RRR without murmur  EXTREMITIES: no cyanosis, clubbing or edema  LYMPH:  No lymphadenopathy.        ASSESSMENT AND PLAN:   Luis Eduardo Briggs is a 74 year old male who presents with upper respiratory symptoms that are consistent with    ASSESSMENT:     Encounter Diagnoses   Name Primary?    Cough with sputum     Persistent cough     Pneumonia of left lower lobe due to infectious organism Yes       Meds & Refills for this Visit:  Requested Prescriptions     Signed Prescriptions Disp Refills    Moxifloxacin HCl 400 MG Oral Tab 7 tablet 0     Sig: Take 1 tablet (400 mg total) by mouth daily for 7 days.       Imaging & Consults:  PROCEDURE:  XR CHEST PA + LAT CHEST (CPT=71046)     INDICATIONS:  R05.3 Persistent cough R05.8 Cough with sputum     COMPARISON:  [facility], XR, XR CHEST PA + LAT CHEST (CPT=71046), 12/30/2024, 2:34 PM.     TECHNIQUE:  PA and lateral chest radiographs were obtained.     PATIENT STATED HISTORY: (As transcribed by Technologist)  Patient has had a productive cough for a week.         FINDINGS:    LUNGS:  Reticular increased density left lower lobe is similar to prior study and could represent persistent atypical pneumonia.  Remainder of lungs is clear.  CARDIAC:  Normal size cardiac silhouette.  MEDIASTINUM:  Aorta is atherosclerotic.  PLEURA:  Normal.  No pleural effusions.  BONES:  Normal for age.                   Impression   CONCLUSION:  Persistent reticular increased density left lower lung could represent atypical pattern of pneumonia.        LOCATION:  [facility]        Dictated by (CST): Jeremie Kim MD on 1/07/2025 at 2:07 PM      Finalized by (CST): eJremie Kim MD on 1/07/2025 at 2:08 PM        PLAN: Comfort care as described in Patient Instructions    Start med as prescribed.   The patient indicates understanding of these issues and agrees to the plan.  The patient is asked to f/u with PCP in 1 week  if sx's  persist or worsen.  To ED for SOB

## 2025-03-19 ENCOUNTER — HOSPITAL ENCOUNTER (EMERGENCY)
Age: 75
Discharge: HOME | End: 2025-03-19
Attending: EMERGENCY MEDICINE
Payer: MEDICARE

## 2025-03-19 VITALS
RESPIRATION RATE: 17 BRPM | WEIGHT: 223 LBS | HEIGHT: 69 IN | BODY MASS INDEX: 33.03 KG/M2 | OXYGEN SATURATION: 95 % | TEMPERATURE: 97 F | DIASTOLIC BLOOD PRESSURE: 82 MMHG | SYSTOLIC BLOOD PRESSURE: 147 MMHG | HEART RATE: 64 BPM

## 2025-03-19 DIAGNOSIS — I1A.0 RESISTANT HYPERTENSION: Primary | ICD-10-CM

## 2025-03-19 LAB
ATRIAL RATE: 64 BPM
P AXIS: 1 DEGREES
P-R INTERVAL: 236 MS
Q-T INTERVAL: 442 MS
QRS DURATION: 134 MS
QTC CALCULATION (BEZET): 455 MS
R AXIS: 15 DEGREES
T AXIS: -7 DEGREES
VENTRICULAR RATE: 64 BPM

## 2025-03-19 PROCEDURE — 93005 ELECTROCARDIOGRAM TRACING: CPT

## 2025-03-19 PROCEDURE — 93010 ELECTROCARDIOGRAM REPORT: CPT

## 2025-03-19 PROCEDURE — 99284 EMERGENCY DEPT VISIT MOD MDM: CPT

## 2025-03-19 PROCEDURE — 99283 EMERGENCY DEPT VISIT LOW MDM: CPT

## 2025-03-19 RX ORDER — AMLODIPINE BESYLATE 5 MG/1
5 TABLET ORAL DAILY
Qty: 30 TABLET | Refills: 0 | Status: SHIPPED | OUTPATIENT
Start: 2025-03-19

## 2025-03-19 RX ORDER — CARVEDILOL 25 MG/1
25 TABLET ORAL 2 TIMES DAILY WITH MEALS
COMMUNITY

## 2025-03-19 RX ORDER — TORSEMIDE 20 MG/1
20 TABLET ORAL DAILY
COMMUNITY

## 2025-03-19 RX ORDER — MELOXICAM 15 MG/1
15 TABLET ORAL DAILY
COMMUNITY

## 2025-03-19 NOTE — DISCHARGE INSTRUCTIONS
Start the amlodipine today and then take it around noon tomorrow and then in the morning after that.  Called cardiologist office and they should attempt to reach you to arrange for follow-up in the office over the next 2 to 3 days.  Return for worsening symptoms

## 2025-03-19 NOTE — ED PROVIDER NOTES
Patient Seen in: [facility] [place]      History     Chief Complaint   Patient presents with    Hypertension     Stated Complaint: was sent by primary due to htn.  pt denies sx    Subjective:   HPI      74-year-old male here for elevated blood pressure.  Patient has a history of heart disease a previous stent and high blood pressure.  He said that over the last few days his blood pressure has been elevated he showed me readings that it was varying between 160 and up to 200 this morning initially was normal but then he checked it a couple hours later and had gone up to 200.  Patient states he realized he had not been taking one of his blood pressure medications carvedilol a few days ago but restarted 2 days ago.  He does take hydralazine 75 mg twice daily and 25 mg carvedilol twice a day.  He is otherwise asymptomatic he said he was feeling somewhat fatigued yesterday after doing yard work but he feels fine today he has no headache blurred vision strokelike symptoms chest pain shortness of breath blood in his urine flank pain fevers.    Objective:     Past Medical History:    CAD (coronary artery disease)    Coronary atherosclerosis    Essential hypertension    Extrinsic asthma, unspecified    Gout    Hearing impairment    High blood pressure    High cholesterol    Hyperlipidemia    Hyperlipidemia associated with type 2 diabetes mellitus (HCC)    Hypertension    Hypertension associated with diabetes (HCC)    Long-term insulin use (HCC)    Sleep apnea    Type II or unspecified type diabetes mellitus without mention of complication, not stated as uncontrolled    Uncontrolled type 2 diabetes with neuropathy    Visual impairment              Past Surgical History:   Procedure Laterality Date    Angioplasty (coronary)      Stent 11/20/2012    Appendectomy      Cath drug eluting stent      Colonoscopy      Knee replacement surgery      Other surgical history  11/20/2012    stent heart    Total knee  replacement      partial-right                Social History     Socioeconomic History    Marital status:    Tobacco Use    Smoking status: Former     Current packs/day: 0.00     Average packs/day: 1.5 packs/day for 21.0 years (31.5 ttl pk-yrs)     Types: Cigarettes     Start date: 1965     Quit date: 1986     Years since quittin.9    Smokeless tobacco: Former   Vaping Use    Vaping status: Never Used   Substance and Sexual Activity    Alcohol use: Yes     Alcohol/week: 12.0 standard drinks of alcohol     Types: 12 Cans of beer per week     Comment: 1/2 bottle vodka/week    Drug use: No     Social Drivers of Health     Food Insecurity: No Food Insecurity (2024)    Received from [facility]    Hunger Vital Sign     Worried About Running Out of Food in the Last Year: Never true     Ran Out of Food in the Last Year: Never true   Transportation Needs: No Transportation Needs (2024)    Received from [facility]    PRAPARE - Transportation     Lack of Transportation (Medical): No     Lack of Transportation (Non-Medical): No   Housing Stability: Low Risk  (2024)    Received from [facility]    Housing Stability Vital Sign     Unable to Pay for Housing in the Last Year: No     Number of Places Lived in the Last Year: 1     Unstable Housing in the Last Year: No                  Physical Exam     ED Triage Vitals   BP 25 1352 (!) 168/83   Pulse 25 1352 66   Resp 25 1352 18   Temp 25 1352 97.2 °F (36.2 °C)   Temp src --    SpO2 25 1352 94 %   O2 Device 25 1412 None (Room air)       Current Vitals:   Vital Signs  BP: 147/82  Pulse: 64  Resp: 17  Temp: 97.2 °F (36.2 °C)    Oxygen Therapy  SpO2: 95 %  O2 Device: None (Room air)        Physical Exam  Alert and oriented ×3 in no acute distress.  HEENT exam within normal limits.  Neck supple without lymphadenopathy or JVD.  Lungs are clear to  auscultation.  Cardiovascular exam regular rate and rhythm without murmurs.  Abdomen is soft and nontender without rebound or guarding.  Extremities no clubbing cyanosis or edema.  Skin there is no rash.  Neurologic exam is within normal limits no focal deficits.    ED Course   Labs Reviewed - No data to display  EKG    Rate, intervals and axes as noted on EKG Report.  Rate: 64  Rhythm: Sinus Rhythm  Reading: Sinus rhythm first AV block right bundle branch block this is an abnormal EKG this is unchanged from prior EKG                       MDM      Initial differential diagnosis considered not limited to includes hypertensive emergency hypertensive crisis hypertensive urgency but not limited these        Medical Decision Making    This patient has asymptomatic transient elevation of blood pressure when I saw him in the room was 149 systolic.  He was asymptomatic had normal exam no evidence of cardiac issue or strokelike issues I discussed the case with [facility] who recommended adding amlodipine and they will see him in office in a couple of days.  Disposition and Plan     Clinical Impression:  1. Resistant hypertension         Disposition:  Discharge  3/19/2025  2:37 pm    Follow-up:  No follow-up provider specified.        Medications Prescribed:  Discharge Medication List as of 3/19/2025  2:38 PM        START taking these medications    Details   amLODIPine 5 MG Oral Tab Take 1 tablet (5 mg total) by mouth daily., Normal, Disp-30 tablet, R-0                 Supplementary Documentation:

## 2025-04-17 ENCOUNTER — OFFICE VISIT (OUTPATIENT)
Dept: NEUROLOGY | Facility: CLINIC | Age: 75
End: 2025-04-17
Payer: MEDICARE

## 2025-04-17 VITALS
WEIGHT: 223 LBS | DIASTOLIC BLOOD PRESSURE: 72 MMHG | HEART RATE: 69 BPM | BODY MASS INDEX: 33.03 KG/M2 | HEIGHT: 69 IN | SYSTOLIC BLOOD PRESSURE: 124 MMHG | RESPIRATION RATE: 16 BRPM

## 2025-04-17 DIAGNOSIS — R41.840 COGNITIVE ATTENTION DEFICIT: ICD-10-CM

## 2025-04-17 DIAGNOSIS — S06.0X0S CONCUSSION WITHOUT LOSS OF CONSCIOUSNESS, SEQUELA: Primary | ICD-10-CM

## 2025-04-17 DIAGNOSIS — I67.9 SMALL VESSEL DISEASE, CEREBROVASCULAR: ICD-10-CM

## 2025-04-17 RX ORDER — DIAZEPAM 5 MG/1
TABLET ORAL
Qty: 2 TABLET | Refills: 0 | Status: SHIPPED | OUTPATIENT
Start: 2025-04-17

## 2025-04-17 NOTE — PATIENT INSTRUCTIONS
Refill policies:    Allow 2-3 business days for refills; controlled substances may take longer.  Contact your pharmacy at least 5 days prior to running out of medication and have them send an electronic request or submit request through the “request refill” option in your [employer] account.  Refills are not addressed on weekends; covering physicians do not authorize routine medications on weekends.  No narcotics or controlled substances are refilled after noon on Fridays or by on call physicians.  By law, narcotics must be electronically prescribed.  A 30 day supply with no refills is the maximum allowed.  If your prescription is due for a refill, you may be due for a follow up appointment.  To best provide you care, patients receiving routine medications need to be seen at least once a year.  Patients receiving narcotic/controlled substance medications need to be seen at least once every 3 months.  In the event that your preferred pharmacy does not have the requested medication in stock (e.g. Backordered), it is your responsibility to find another pharmacy that has the requested medication available.  We will gladly send a new prescription to that pharmacy at your request.    Scheduling Tests:    If your physician has ordered radiology tests such as MRI or CT scans, please contact Central Scheduling at [phone] right away to schedule the test.  Once scheduled, the [facility] Centralized Referral Team will work with your insurance carrier to obtain pre-certification or prior authorization.  Depending on your insurance carrier, approval may take 3-10 days.  It is highly recommended patients assure they have received an authorization before having a test performed.  If test is done without insurance authorization, patient may be responsible for the entire amount billed.      Precertification and Prior Authorizations:  If your physician has recommended that you have a procedure or additional testing performed the [facility]  Centralized Referral Team will contact your insurance carrier to obtain pre-certification or prior authorization.    You are strongly encouraged to contact your insurance carrier to verify that your procedure/test has been approved and is a COVERED benefit.  Although the [facility] Centralized Referral Team does its due diligence, the insurance carrier gives the disclaimer that \"Although the procedure is authorized, this does not guarantee payment.\"    Ultimately the patient is responsible for payment.   Thank you for your understanding in this matter.  Paperwork Completion:  If you require FMLA or disability paperwork for your recovery, please make sure to either drop it off or have it faxed to our office at [phone]. Be sure the form has your name and date of birth on it.  The form will be faxed to our Forms Department and they will complete it for you.  There is a 25$ fee for all forms that need to be filled out.  Please be aware there is a 10-14 day turnaround time.  You will need to sign a release of information (MARCY) form if your paperwork does not come with one.  You may call the Forms Department with any questions at [phone].  Their fax number is [phone].

## 2025-04-17 NOTE — PROGRESS NOTES
[facility]   Neurology; INITIAL CLINIC VISIT  2025, 1:42 PM     Luis Eduardo Briggs Patient Status:  No patient class for patient encounter    1950 MRN [id number]   Location [facility] Attending No att. providers found     PCP PARVEEN Mishra     This is a consultation requested by () PCP   (x) other Specialist   () Physician Extender    REASON FOR THE VISIT:  Memory     HISTORY OF PRESENT ILLNESS:  Luis Eduardo Briggs is a(n) 74 year old male.     Patient experiences 2 falls sometime in December.  The first 1 was related to having had too much to drink but the second 1, he tripped and fell but sustained no loss of consciousness.  He was scheduled for surgery of low back but because of his history of falling neurology clearance was recommended.    Since the fall in December he has had noticeable forgetfulness and oftentimes would forget conversations or things that he was told of.  He is still able to drive and does not get lost in conversation seems all right.    He is not a heavy drinker of usually drinks 3-6 drinks 2-3 times a month.    In addition he complains of sometimes getting lightheaded.      PAST MEDICAL HISTORY:  Past Medical History[1]    PAST SURGICAL HISTORY:  Past Surgical History[2]    FAMILY HISTORY:  family history includes Cancer in his father; Other in his mother.    SOCIAL HISTORY:   reports that he quit smoking about 39 years ago. His smoking use included cigarettes. He started smoking about 60 years ago. He has a 31.5 pack-year smoking history. He has quit using smokeless tobacco. He reports current alcohol use of about 12.0 standard drinks of alcohol per week. He reports that he does not use drugs.    ALLERGIES:  Allergies[3]    MEDICATIONS:  Medications - Current[4]      REVIEW OF SYSTEMS:  A comprehensive 10 point review of systems was completed.  Pertinent positives and negatives noted in the the HPI.      PHYSICAL EXAMINATION:  /72 (BP Location: Left arm, Patient Position: Sitting, Cuff Size: adult)   Pulse 69   Resp 16   Ht 69\"   Wt 223 lb (101.2 kg)   BMI 32.93 kg/m²   Looks stated age, PC AS  No LAD, no TM  Lungs CTA  Heart S1S2  [place] nailbeds no Cyanosis    NEURO:  Alert oriented with fluent speech intact comprehension.  We performed the slums examination and scored 0 on the mathematical calculation and could only name 7 animals in 1 minute.  She scored 6 on the storytelling question and has a total score of 23 which was in the range of mild neurocognitive disorder.    The rest of his cranial nerve findings are normal  There were no lateralizing motor and sensory findings  He has diffuse hyperreflexia  Gait and coordination were within normal limits.    Special Test:        DIAGNOSTIC DATA:      IMAGING:  CT Dec 5, 2024    Patchy periventricular white matter hypodensity most in keeping with chronic microvascular ischemia.  Intracranial atherosclerosis.         PROBLEM/S IDENTIFIED THIS VISIT:  1. Concussion without loss of consciousness, sequela    2. Cognitive attention deficit    3. Small vessel disease, cerebrovascular        Discussion: Given the fact she had 2 lucencies in the subcortical area I suspect that cognitive complaints may be related more towards small vessel disease.  He is at risk for this given his history of hypertension and diabetes.    PLAN:  Diagnostic:        Procedures    MRI BRAIN (W+WO) (CPT=70553) [[id number]]    MRA CAROTIDS (W+WO) (CPT=70549)         Referrals:  none, but will need Neuropsychological evaluation later    Treatments:  deferred    Others:  stroke risk factors discussed      Follow up:  Return for treatment plan after reviewing MRI.      Mario Quintanilla MD  Vascular & General Neurology  Director, Multiple Sclerosis Program  [facility]  4/17/2025, Time completed 1:42 PM    After visit Summary handed to patient by RN or MA and was escorted  out and handed-off discharge process and instructions to the check out desk.  No additional issues raised to staff at this time interval    This document is to be interpreted as my current opinion regarding the case as of the stated date of service based on the information available to me at this time and supersedes any prior opinion expressed either orally or in writing.  Services rendered are only within the scope of direct medical care.  Sometimes, reports may have been prepared partially using a speech recognition software technology.  If a word or phrase is confusing or out of context, please do not hesitate to call for clarification.              [1]   Past Medical History:   CAD (coronary artery disease)    Coronary atherosclerosis    Essential hypertension    Extrinsic asthma, unspecified    Gout    Hearing impairment    High blood pressure    High cholesterol    Hyperlipidemia    Hyperlipidemia associated with type 2 diabetes mellitus (HCC)    Hypertension    Hypertension associated with diabetes (HCC)    Long-term insulin use (HCC)    Sleep apnea    Type II or unspecified type diabetes mellitus without mention of complication, not stated as uncontrolled    Uncontrolled type 2 diabetes with neuropathy    Visual impairment   [2]   Past Surgical History:  Procedure Laterality Date    Angioplasty (coronary)      Stent 11/20/2012    Appendectomy      Cath drug eluting stent      Colonoscopy      Knee replacement surgery      Other surgical history  11/20/2012    stent heart    Total knee replacement      partial-right   [3]   Allergies  Allergen Reactions    Clonidine Derivatives OTHER (SEE COMMENTS)    Doxycycline OTHER (SEE COMMENTS)     Other Reaction(s): Other (see comments)    Metformin OTHER (SEE COMMENTS)     Other Reaction(s): Other (see comments)      Increased phlegm    Increased phlegm    Lisinopril Coughing   [4]   Current Outpatient Medications:     diazePAM (VALIUM) 5 MG Oral Tab, 1 tablet an hour  before MRI and may take 2nd tab during MRI as needed, Disp: 2 tablet, Rfl: 0    Meloxicam 15 MG Oral Tab, Take 1 tablet (15 mg total) by mouth daily., Disp: , Rfl:     carvedilol 25 MG Oral Tab, Take 1 tablet (25 mg total) by mouth 2 (two) times daily with meals., Disp: , Rfl:     torsemide 20 MG Oral Tab, Take 1 tablet (20 mg total) by mouth daily., Disp: , Rfl:     amLODIPine 5 MG Oral Tab, Take 1 tablet (5 mg total) by mouth daily., Disp: 30 tablet, Rfl: 0    ALPRAZolam 0.5 MG Oral Tab, Take 1 tablet (0.5 mg total) by mouth 2 (two) times daily as needed., Disp: , Rfl:     tamsulosin 0.4 MG Oral Cap, Take 1 capsule (0.4 mg total) by mouth at bedtime., Disp: , Rfl:     Budesonide-Formoterol Fumarate 80-4.5 MCG/ACT Inhalation Aerosol, Inhale 1 puff into the lungs 2 (two) times daily., Disp: , Rfl:     Esomeprazole Magnesium 40 MG Oral Capsule Delayed Release, Take 1 capsule (40 mg total) by mouth 2 (two) times daily before meals., Disp: , Rfl:     hydrALAzine HCl 50 MG Oral Tab, Take 1.5 tablets (75 mg total) by mouth 2 (two) times daily., Disp: , Rfl:     Multiple Vitamins-Minerals (CENTRUM ULTRA MENS) Oral Tab, Take by mouth., Disp: , Rfl:     Potassium Chloride ER 10 MEQ Oral Tab CR, Take 1 tablet (10 mEq total) by mouth daily., Disp: , Rfl:     aspirin 81 MG Oral Tab, Take 1 tablet (81 mg total) by mouth daily., Disp: 30 tablet, Rfl: 0    allopurinol 300 MG Oral Tab, Take 1 tablet (300 mg total) by mouth daily., Disp: , Rfl:     Clopidogrel Bisulfate 75 MG Oral Tab, Take 1 tablet (75 mg total) by mouth daily., Disp: , Rfl:     Atorvastatin Calcium 40 MG Oral Tab, Take 1 tablet (40 mg total) by mouth nightly., Disp: 30 tablet, Rfl: 6

## 2025-05-20 ENCOUNTER — HOSPITAL ENCOUNTER (OUTPATIENT)
Dept: MRI IMAGING | Facility: HOSPITAL | Age: 75
Discharge: HOME | End: 2025-05-20
Attending: Other
Payer: MEDICARE

## 2025-05-20 DIAGNOSIS — R41.840 COGNITIVE ATTENTION DEFICIT: ICD-10-CM

## 2025-05-20 DIAGNOSIS — S06.0X0S CONCUSSION WITHOUT LOSS OF CONSCIOUSNESS, SEQUELA: ICD-10-CM

## 2025-05-20 DIAGNOSIS — I67.9 SMALL VESSEL DISEASE, CEREBROVASCULAR: ICD-10-CM

## 2025-05-20 PROCEDURE — 70549 MR ANGIOGRAPH NECK W/O&W/DYE: CPT | Performed by: OTHER

## 2025-05-20 PROCEDURE — 70553 MRI BRAIN STEM W/O & W/DYE: CPT | Performed by: OTHER

## 2025-05-20 PROCEDURE — A9575 INJ GADOTERATE MEGLUMI 0.1ML: HCPCS | Performed by: OTHER

## 2025-05-20 RX ORDER — GADOTERATE MEGLUMINE 376.9 MG/ML
20 INJECTION INTRAVENOUS
Status: COMPLETED | OUTPATIENT
Start: 2025-05-20 | End: 2025-05-20

## 2025-05-20 RX ORDER — GADOTERATE MEGLUMINE 376.9 MG/ML
20 INJECTION INTRAVENOUS
Status: DISCONTINUED | OUTPATIENT
Start: 2025-05-20 | End: 2025-05-22

## 2025-05-20 RX ADMIN — GADOTERATE MEGLUMINE 20 ML: 376.9 INJECTION INTRAVENOUS at 17:15:00

## 2025-05-21 NOTE — PROGRESS NOTES
Luis Eduardo    The MRA which looks at the blood vessels supplying the brain showed normal caliber with no obstructions seen.  Minor changes due to aging noted.    As for the MRI of the brain, it did show the presence of what we call hardening of the small arteries.  The changes are due to what could be mini strokes due to blockages of the small capillaries.  If there is enough of this, this could account for some memory problems.  We can talk about this on your follow-up visit but to prevent it from worsening, the usual management of risk factors if present are advised including control of blood pressure, blood sugar and cholesterol and of course smoking cessation if you do smoke.    Mario Quintanilla MD  Vascular & General Neurology  Multiple Sclerosis & related disorders  [facility]  5/21/2025, Time completed 1:52 PM

## 2025-06-06 ENCOUNTER — TELEPHONE (OUTPATIENT)
Dept: OBSTETRICS AND GYNECOLOGY | Facility: CLINIC | Age: 75
End: 2025-06-06
Payer: MEDICARE

## 2025-06-06 DIAGNOSIS — R53.83 OTHER FATIGUE: ICD-10-CM

## 2025-06-06 DIAGNOSIS — D64.9 ANEMIA, UNSPECIFIED TYPE: Primary | ICD-10-CM

## 2025-06-06 NOTE — TELEPHONE ENCOUNTER
Contacted patient, scheduled Colonoscopy and EGD for June 18 with Dr. Corral  at [facility] .    Reviewed per [facility] Medication Guidelines for Neuraxial Anesthesia:     - if taking blood thinners ask, PCP for specific instructions. You may continue aspirin.Plavix (Clopidogrel) stop 7 days prior to procedure    - if taking diabetic medications, ask PCP for specific instructions Not Applicable    - if taking weight loss medications, ask PCP for specific instructionsNot Applicable    - if taking erectile medications,Not Applicable     - Stop all vitamins and supplements 7 days prior to procedure.    - Continue all other prescribed medications as ordered.    - Need to have a  to accompany you the day of your colonoscopy to drive you home afterwards. You will not be able to drive home.     - Low Fiber and Low Residue diet 5 days prior to your colonoscopy.     - Nothing to eat starting the day prior to surgery until after the procedure is complete    - Do you struggle with constipation   Bowel prep includes GoLytely  He already has a jug of GolCache IQ so no Rx was sent in    - Surgery schedulers will call 2-3 days prior to your procedure to give procedure time.     Instructions were sent through [employer] per patients request.    Orders placed and online schedule updated Yes

## 2025-06-06 NOTE — TELEPHONE ENCOUNTER
Contacted patient, he was advised to get an EGD and Colonoscopy due to anemia and fatigue.   He would like to get it done sooner than later.   Gave him a couple of options but needs to follow up before he can commit. Patient will call back with a date.

## 2025-06-12 RX ORDER — ASCORBIC ACID 500 MG
1 TABLET ORAL EVERY 24 HOURS
COMMUNITY
Start: 2024-03-01

## 2025-06-12 RX ORDER — MULTIVITAMIN
1 TABLET ORAL DAILY
COMMUNITY

## 2025-06-12 NOTE — PRE-PROCEDURE INSTRUCTIONS
Pre-Surgery Instructions:   Medication Instructions    ascorbic acid, vitamin C, (ascorbic acid with raquel hips) 500 mg tablet Stop 1 week prior to surgery/procedure    multivitamin (THERAGRAN) tablet tablet Stop 1 week prior to surgery/procedure    meloxicam (MOBIC) 15 mg tablet Continue as prescribed do not hold    potassium chloride 10 mEq CR tablet Continue as prescribed do not hold    torsemide (DEMADEX) 20 mg tablet Continue as prescribed do not hold    tamsulosin (FLOMAX) 0.4 mg capsule Continue as prescribed do not hold    allopurinoL (ZYLOPRIM) 300 mg tablet Continue as prescribed do not hold    atorvastatin (LIPITOR) 40 mg tablet Continue as prescribed do not hold    carvediloL (COREG) 12.5 mg tablet Continue as prescribed do not hold    gabapentin (NEURONTIN) 300 mg capsule Continue as prescribed do not hold    glipiZIDE (GLUCOTROL) 5 mg tablet Contact PCP for instruction    hydrALAZINE (APRESOLINE) 50 mg tablet Continue as prescribed do not hold    aspirin 81 mg EC tablet Continue as prescribed do not hold       Preop questionnaire:  Preop Questionairre  Does patient have physical restrictions or limitations?: No  Has patient had an upper respiratory tract infection in the last 2 weeks?: No  Home Oxygen: No    Preop instructions:  Pre-op Phone Call  Surgery Time Verified: No  Arrival Time Verified: No  Surgery Location Verified: Yes  Instructed to shower within 12 hours: Yes  Patient has special physician orders: No  Does Patient Require Bowel Prep for Procedure?: Yes  Type: Miralax/Bisocodyl  Instructed to remove/not apply makeup, petroleum products, lotion, powder, scents, or deodorant on the morning of surgery: Yes  Recommend eye glasses for day of surgery, contact lenses must be removed prior to surgery:: N/A  Instructed to bring CPAP mask: N/A  Instructed to bring Oxygen tank from home: N/A  Does the patient wear a glucose monitoring device?: N/A  Instructed to remove all jewelry, including  piercings, and leave at home.: Yes  Instructed to leave all valuables at home: Yes  Instructed to leave all medications at home: Yes  Instructed to bring a valid photo ID, insurance card and form of payment: Yes  NPO Status Reinforced: Yes  Instruct patient to ensure transportation is available following surgery/procedure:: Yes  Consider having a caregiver stay with the patient for 24 hours following anesthesia:: Yes

## 2025-06-16 ENCOUNTER — ANESTHESIA EVENT (OUTPATIENT)
Dept: GASTROENTEROLOGY | Facility: HOSPITAL | Age: 75
End: 2025-06-16
Payer: MEDICARE

## 2025-06-16 NOTE — ANESTHESIA PREPROCEDURE EVALUATION
"Pre-Procedure Assessment    Patient: Dannie Hastings, male, 74 y.o.    Ht Readings from Last 1 Encounters:   No data found for Ht     Wt Readings from Last 1 Encounters:   06/11/25 102.1 kg (225 lb)       Last Vitals  BP      Temp      Pulse     Resp      SpO2      Pain Score         Problem list reviewed and Medical history reviewed    No history of anesthetic complications:    No family history of anesthetic complications:      Airway   Mallampati: III  TM distance: >3 FB  Neck ROM: full      Dental      Comment: Extremely poor dentition    Pulmonary - normal exam   (+) asthma, sleep apnea on CPAP  (-) shortness of breath  Cardiovascular   Exercise tolerance: good (4-7 METS)  (+) hypertension well controlled, past MI, CAD, PVD  (-) angina    Rhythm: regular  Rate: normal    Mental Status/Neuro/Psych    Pt is alert.      (+) psychiatric history (PTSD), arthritis, back injury (DJD)    GI/Hepatic/Renal    (-) GERD    Endo/Other    (+) diabetes mellitus  Abdominal             Social History     Tobacco Use    Smoking status: Never    Smokeless tobacco: Never   Substance Use Topics    Alcohol use: Yes      Hematology   WBC   Date Value Ref Range Status   09/28/2023 8.7 3.7 - 9.6 10*3/uL Final     RBC   Date Value Ref Range Status   09/28/2023 5.13 4.10 - 5.80 10*6/µL Final     MCV   Date Value Ref Range Status   09/28/2023 81.3 (L) 82.0 - 97.0 fL Final     Hemoglobin   Date Value Ref Range Status   09/28/2023 13.6 13.2 - 17.2 g/dL Final     Hematocrit   Date Value Ref Range Status   09/28/2023 41.7 38.0 - 50.0 % Final     Platelets   Date Value Ref Range Status   09/28/2023 232 130 - 350 10*3/uL Final      Coagulation No results found for: \"PT\", \"APTT\", \"INR\"   General Chemistry   Calcium   Date Value Ref Range Status   09/28/2023 9.4 8.6 - 10.3 mg/dL Final     BUN   Date Value Ref Range Status   09/28/2023 33 (H) 7 - 25 mg/dL Final     Creatinine   Date Value Ref Range Status   09/28/2023 0.65 (L) 0.70 - 1.30 mg/dL Final "     Glucose   Date Value Ref Range Status   09/28/2023 129 (H) 70 - 105 mg/dL Final     Sodium   Date Value Ref Range Status   09/28/2023 138 135 - 145 mmol/L Final     Potassium   Date Value Ref Range Status   09/28/2023 4.0 3.5 - 5.1 MMOL/L Final     CO2   Date Value Ref Range Status   09/28/2023 28 21 - 32 mmol/L Final     Chloride   Date Value Ref Range Status   09/28/2023 103 98 - 107 mmol/L Final     Anesthesia Plan    ASA 3   NPO status reviewed: > 8 hours    MAC                          Anesthetic plan and risks discussed with patient.

## 2025-06-18 ENCOUNTER — HOSPITAL ENCOUNTER (OUTPATIENT)
Dept: GASTROENTEROLOGY | Facility: HOSPITAL | Age: 75
Discharge: HOME | End: 2025-06-18
Payer: MEDICARE

## 2025-06-18 ENCOUNTER — ANESTHESIA (OUTPATIENT)
Dept: GASTROENTEROLOGY | Facility: HOSPITAL | Age: 75
End: 2025-06-18
Payer: MEDICARE

## 2025-06-18 VITALS
RESPIRATION RATE: 18 BRPM | TEMPERATURE: 97.5 F | BODY MASS INDEX: 32.29 KG/M2 | DIASTOLIC BLOOD PRESSURE: 65 MMHG | SYSTOLIC BLOOD PRESSURE: 110 MMHG | HEIGHT: 69 IN | HEART RATE: 62 BPM | OXYGEN SATURATION: 93 % | WEIGHT: 218 LBS

## 2025-06-18 DIAGNOSIS — R53.83 OTHER FATIGUE: ICD-10-CM

## 2025-06-18 DIAGNOSIS — D64.9 ANEMIA, UNSPECIFIED TYPE: ICD-10-CM

## 2025-06-18 PROCEDURE — (BLANK)

## 2025-06-18 PROCEDURE — 00813 ANES UPR LWR GI NDSC PX: CPT | Performed by: NURSE ANESTHETIST, CERTIFIED REGISTERED

## 2025-06-18 PROCEDURE — (BLANK) HC MAC ANESTHESIA FACILITY CHARGE EACH ADDITIONAL MIN

## 2025-06-18 PROCEDURE — 99100 ANES PT EXTEME AGE<1 YR&>70: CPT | Performed by: NURSE ANESTHETIST, CERTIFIED REGISTERED

## 2025-06-18 PROCEDURE — 43239 EGD BIOPSY SINGLE/MULTIPLE: CPT | Mod: 51 | Performed by: SURGERY

## 2025-06-18 PROCEDURE — 2720000000 HC SUPP 272 WO HCPCS

## 2025-06-18 PROCEDURE — 45385 COLONOSCOPY W/LESION REMOVAL: CPT | Performed by: SURGERY

## 2025-06-18 PROCEDURE — (BLANK) HC MAC ANESTHESIA FACILITY CHARGE 1ST 15 MIN

## 2025-06-18 PROCEDURE — 2500000200 HC RX 250 WO HCPCS: Performed by: SURGERY

## 2025-06-18 PROCEDURE — (BLANK) HC RECOVERY PHASE-2 1ST 1/2 HOUR ACUITY LEVEL 1

## 2025-06-18 PROCEDURE — 2580000300 HC RX 258: Performed by: NURSE PRACTITIONER

## 2025-06-18 PROCEDURE — 6360000200 HC RX 636 W HCPCS (ALT 250 FOR IP): Performed by: NURSE ANESTHETIST, CERTIFIED REGISTERED

## 2025-06-18 RX ORDER — CLOPIDOGREL BISULFATE 75 MG/1
75 TABLET ORAL DAILY
COMMUNITY

## 2025-06-18 RX ORDER — SODIUM CHLORIDE 9 MG/ML
50 INJECTION, SOLUTION INTRAVENOUS CONTINUOUS
Status: DISCONTINUED | OUTPATIENT
Start: 2025-06-18 | End: 2025-06-19 | Stop reason: HOSPADM

## 2025-06-18 RX ORDER — TOPICAL ANESTHETIC 200 MG/ML
SPRAY DENTAL; PERIODONTAL AS NEEDED
Status: COMPLETED | OUTPATIENT
Start: 2025-06-18 | End: 2025-06-18

## 2025-06-18 RX ORDER — LIDOCAINE HYDROCHLORIDE 20 MG/ML
INJECTION, SOLUTION EPIDURAL; INFILTRATION; INTRACAUDAL; PERINEURAL AS NEEDED
Status: DISCONTINUED | OUTPATIENT
Start: 2025-06-18 | End: 2025-06-18 | Stop reason: SURG

## 2025-06-18 RX ORDER — PROPOFOL 10 MG/ML
INJECTION, EMULSION INTRAVENOUS AS NEEDED
Status: DISCONTINUED | OUTPATIENT
Start: 2025-06-18 | End: 2025-06-18 | Stop reason: SURG

## 2025-06-18 RX ORDER — TOPICAL ANESTHETIC 200 MG/ML
SPRAY DENTAL; PERIODONTAL
Status: DISCONTINUED
Start: 2025-06-18 | End: 2025-06-19 | Stop reason: HOSPADM

## 2025-06-18 RX ADMIN — PROPOFOL 40 MG: 10 INJECTION, EMULSION INTRAVENOUS at 07:01

## 2025-06-18 RX ADMIN — PROPOFOL 20 MG: 10 INJECTION, EMULSION INTRAVENOUS at 07:18

## 2025-06-18 RX ADMIN — PROPOFOL 20 MG: 10 INJECTION, EMULSION INTRAVENOUS at 07:14

## 2025-06-18 RX ADMIN — PROPOFOL 20 MG: 10 INJECTION, EMULSION INTRAVENOUS at 07:08

## 2025-06-18 RX ADMIN — PROPOFOL 20 MG: 10 INJECTION, EMULSION INTRAVENOUS at 07:16

## 2025-06-18 RX ADMIN — LIDOCAINE HYDROCHLORIDE 50 MG: 20 INJECTION, SOLUTION EPIDURAL; INFILTRATION; INTRACAUDAL; PERINEURAL at 07:09

## 2025-06-18 RX ADMIN — PROPOFOL 20 MG: 10 INJECTION, EMULSION INTRAVENOUS at 07:10

## 2025-06-18 RX ADMIN — LIDOCAINE HYDROCHLORIDE 50 MG: 20 INJECTION, SOLUTION EPIDURAL; INFILTRATION; INTRACAUDAL; PERINEURAL at 07:00

## 2025-06-18 RX ADMIN — PROPOFOL 20 MG: 10 INJECTION, EMULSION INTRAVENOUS at 07:06

## 2025-06-18 RX ADMIN — PROPOFOL 20 MG: 10 INJECTION, EMULSION INTRAVENOUS at 07:02

## 2025-06-18 RX ADMIN — PROPOFOL 20 MG: 10 INJECTION, EMULSION INTRAVENOUS at 07:12

## 2025-06-18 RX ADMIN — PROPOFOL 20 MG: 10 INJECTION, EMULSION INTRAVENOUS at 07:03

## 2025-06-18 RX ADMIN — LIDOCAINE HYDROCHLORIDE 50 MG: 20 INJECTION, SOLUTION EPIDURAL; INFILTRATION; INTRACAUDAL; PERINEURAL at 07:01

## 2025-06-18 RX ADMIN — TOPICAL ANESTHETIC 1 SPRAY: 200 SPRAY DENTAL; PERIODONTAL at 06:59

## 2025-06-18 RX ADMIN — SODIUM CHLORIDE 50 ML/HR: 9 INJECTION, SOLUTION INTRAVENOUS at 06:40

## 2025-06-18 RX ADMIN — PROPOFOL 20 MG: 10 INJECTION, EMULSION INTRAVENOUS at 07:04

## 2025-06-18 RX ADMIN — LIDOCAINE HYDROCHLORIDE 50 MG: 20 INJECTION, SOLUTION EPIDURAL; INFILTRATION; INTRACAUDAL; PERINEURAL at 07:13

## 2025-06-18 ASSESSMENT — ENCOUNTER SYMPTOMS
SHORTNESS OF BREATH: 0
EXERCISE TOLERANCE: GOOD (4-7 METS)

## 2025-06-18 NOTE — H&P
Preoperative HP  Attending is Dr. Corral  Date: 6/18/2025   Patient: Dannie Hastings   MRN: [id number]      HISTORY OF PRESENT ILLNESS:  Dannie Hastings is a 74 y.o.  male who presents for EGD/Colon    Past Medical History:   Diagnosis Date    Arthritis     Asthma     Diabetes mellitus (CMS/HCC)     Hypertension     Myocardial infarction (CMS/HCC)     Sleep apnea     CPAP     Past Surgical History:   Procedure Laterality Date    ANKLE SURGERY Left     2011    APPENDECTOMY      1967    CARDIAC CATHETERIZATION      2003 - heart stent    COLON SURGERY      1967    KNEE SURGERY Right     x5    LIPOMA RESECTION Left     buttocks     (Not in a hospital admission)    Current Outpatient Medications   Medication Sig Dispense Refill    ascorbic acid, vitamin C, (ascorbic acid with raquel hips) 500 mg tablet Take 1 tablet (500 mg total) by mouth daily      multivitamin (THERAGRAN) tablet tablet Take 1 tablet by mouth daily      meloxicam (MOBIC) 15 mg tablet Take 1 tablet (15 mg total) by mouth daily      potassium chloride 10 mEq CR tablet Take 1 tablet (10 mEq total) by mouth daily      torsemide (DEMADEX) 20 mg tablet Take 1 tablet (20 mg total) by mouth daily      tamsulosin (FLOMAX) 0.4 mg capsule Take 1 capsule (0.4 mg total) by mouth daily      allopurinoL (ZYLOPRIM) 300 mg tablet Take 1 tablet (300 mg total) by mouth daily      aspirin 81 mg EC tablet Take 1 tablet (81 mg total) by mouth daily      atorvastatin (LIPITOR) 40 mg tablet Take 1 tablet (40 mg total) by mouth daily      carvediloL (COREG) 12.5 mg tablet Take 1 tablet (12.5 mg total) by mouth 2 times a day with meals      gabapentin (NEURONTIN) 300 mg capsule Take 1 capsule (300 mg total) by mouth nightly      glipiZIDE (GLUCOTROL) 5 mg tablet Take 1 tablet (5 mg total) by mouth      hydrALAZINE (APRESOLINE) 50 mg tablet Take 1 tablet (50 mg total) by mouth 3 times daily      clopidogreL (PLAVIX) 75 mg tablet Take 1 tablet (75 mg total) by mouth daily       Current  Facility-Administered Medications   Medication Dose Route Frequency Provider Last Rate Last Admin    sodium chloride 0.9 % infusion  50 mL/hr intravenous Continuous Rai Ortiz, NEVIN 50 mL/hr at 06/18/25 0640 50 mL/hr at 06/18/25 0640    benzocaine (HURRICAINE) 20 % spray              Allergies   Allergen Reactions    Clonidine     Metformin     Vibramycin [Doxycycline Calcium] Other (see comments)     No family status information on file.     Social History     Socioeconomic History    Marital status:    Tobacco Use    Smoking status: Never    Smokeless tobacco: Never   Substance and Sexual Activity    Alcohol use: Yes    Drug use: Never    Sexual activity: Defer     Social Drivers of Health     Tobacco Use: Low Risk  (6/18/2025)    Patient History     Smoking Tobacco Use: Never     Smokeless Tobacco Use: Never   Recent Concern: Tobacco Use - Medium Risk (4/17/2025)    Received from [facility]    Patient History     Smoking Tobacco Use: Former     Smokeless Tobacco Use: Former   Financial Resource Strain: Low Risk  (5/16/2024)    Received from [facility]    Overall Financial Resource Strain (CARDIA)     Difficulty of Paying Living Expenses: Not hard at all   Food Insecurity: No Food Insecurity (5/16/2024)    Received from [facility]    Hunger Vital Sign     Worried About Running Out of Food in the Last Year: Never true     Ran Out of Food in the Last Year: Never true   Transportation Needs: No Transportation Needs (5/16/2024)    Received from [facility]    PRAPARE - Transportation     Lack of Transportation (Medical): No     Lack of Transportation (Non-Medical): No   Physical Activity: Inactive (8/25/2020)    Received from [facility]    Exercise Vital Sign     Days of Exercise per Week: 0 days     Minutes of Exercise per Session: 0 min   Depression: Not at risk (9/6/2024)    [facility] from [facility]    PHQ-2     PHQ-2 Score: 0   Housing Stability: Low Risk  (5/16/2024)    Received from [facility]    Housing Stability Vital Sign     Unable to Pay for Housing in the Last Year: No     Number of Places Lived in the Last Year: 1     Unstable Housing in the Last Year: No     No, Pcp    I have independently reviewed the patient's medical, social, surgical and family history as listed above. In addition I have reviewed their allergies and medications.     Resuscitation Status: full    REVIEW OF SYSTEMS:  10 points ROS positive per HPI, all other systems negative.      PHYSICAL EXAMINATION:  Vital Signs Last 24 Hours:  [unfilled]  Weight: 98.9 kg (218 lb)  [unfilled]    Constitutional: Awake, alert, no acute distress  Eyes:  Pupils are equal, round, reactive to light.  EOMI. No scleral icterus.  Conjunctiva are without injection.  ENMT: Mucous membranes moist, Dentition and gums are intact.   Neck: Soft, supple, trachea midline.    Endocrine: The thyroid is without masses and mobile with swallow.   Lymphatic: There is no cervical, submandibular, supraclavicular/infraclavicular, or inguinal adenopathy.  Respiratory: Lungs are clear to auscultation and percussion bilaterally.   Cardiovascular: Regular rate and rhythm. No murmurs, rubs, or gallops.  no bilateral lower extremity edema. 2+ bilateral radial, dorsalis pedis, and posterior tibial pulses. No carotid bruits on ascultation.  Abdomen: Non-distended, non-tender, normoactive bowel sounds present, No masses, umbilical or inguinal hernias, or flank tenderness. No hepatosplenomegaly. No abdominal bruit noted.  Rectal: External exam shows normal anal folds, no external lesions, digital exam shows normal sphincter and no internal masses.    Musculoskeletal: No spinal or CVA tenderness. Full range of motion in the upper and lower extremities.    Skin: No skin rashes or lesions to inspection.  No petechia.    Neurologic: Cranial nerves II  through XII are grossly intact and symmetric. No motor deficits. Reflexes are 2+ in biceps, triceps, patella, and achilles. Toes downgoing.    Psychiatric: The patient is alert and oriented times 3.  The patient's affect is not blunted and mood is appropriate.    INVESTIGATIONS:      MEDICAL DECISION MAKING:  I have personally reviewed previous records    ASSESSMENT & PLAN:  74 y.o. male presenting for EGD and colonoscopy.       I have thoroughly counseled the patient regarding their diagnosis. Please see the Procedure note for the indication for the procedure and who was present for the consent.      The patient was informed that the proposed endoscopy involves placing a flexible lighted tube into the intestine and does offer a very high likelihood of completing a full examination of the proposed portion of the intestine for the purposes of diagnosing the cause of symptoms, guiding treatment, or preventing disease.  Please see the GI Procedure note for the endoscopy description documentation.    The patient was made aware of the risks of the procedure, including but not limited to perforation of the intestine, hemorrhage from the intestine, heart arrhythmias, infection, missed lesions, and a remote possibility of death.  Additional difficulties may be encountered during recovery to include abdominal pain, bleeding, nausea, and vomiting. Surgery may be required to correct these complications.     In the course of the evaluation I did discuss other therapeutic options with the patient, including radiologic studies and surgical exploration. The risks and benefits of these options were also discussed which include but are not limited to missed lesions or surgical complications.     Also discussed were possible problems or difficulties the patient may encounter if treatment was not pursued at this time. These include bleeding, death, or infection from undiagnosed disease.      The patient was informed that Dr. Corral  will be primarily responsible for the procedure.  Assistance during the procedure and during hospitalization may also be provided by other physicians, nurses and technicians, some of whom may be students.      The patient was informed that some medical information or findings encountered in the course of this procedure may be utilized for research and/or teaching purposes. However, should this occur, their identity will be kept anonymous with the exception of criminal cases when evidence is required to be provided to legal authorities for forensic purposes.     The patient will be provided additional education resources by the support staff including availability of an educational video.  If there are ever any questions regarding their diagnosis or the procedure, the patient is encouraged to ask.    All of the patient’s questions have been answered to their satisfaction and they have indicated a clear understanding of this discussion.    Rosendo Corral MD

## 2025-06-18 NOTE — ANESTHESIA POSTPROCEDURE EVALUATION
Patient: Dannie Hastings    Procedure Summary       Date: 06/18/25 Room / Location: [facility]    Anesthesia Start: 0658 Anesthesia Stop: 0721    Procedures:       EGD      COLONOSCOPY Diagnosis:       Anemia, unspecified type      Other fatigue    Scheduled Providers: Rosendo Corral MD; Ortiz Whitfield CRNA Responsible Provider: Ortiz Whitfield CRNA    Anesthesia Type: MAC ASA Status: 3            Anesthesia Type: MAC    Last vitals   Vitals Value Taken Time   /65 06/18/25 07:22   Temp 36.3 °C (97.3 °F) 06/18/25 07:22   Pulse 59 06/18/25 07:22   Resp     SpO2 92 % 06/18/25 07:22   Pain Score         Anesthesia Post Evaluation    Patient location during evaluation: bedside  Patient participation: complete - patient participated  Level of consciousness: awake and alert  Pain management: adequate  Airway patency: patent  Cardiovascular status: acceptable  Respiratory status: acceptable  Hydration status: acceptable  May dismiss recovered patient based on consultation with the appropriate physicians and/or meeting appropriate discharge criteria     There were no known notable events for this encounter.    Cosmetic?  This procedure is not cosmetic.

## 2025-06-18 NOTE — DISCHARGE INSTRUCTIONS
Post Endoscopy Discharge Instructions                                                                                                                                                                                                                                                                                         NORMAL SYMPTOMS YOU MAY EXPERIENCE IN THE NEXT 24 HOURS  -Slight bloated feeling and/or Gas  -A Small amount of blood in your stool for 24 hours  -Mild Cramping  -Sore throat  -Drowsiness and/or Forgetfulness    WHEN TO CALL THE DOCTOR  -Unusual Pain  -Fever  -Prolonged or Heavy Bleeding  -Blood in stool 2-3 days after the procedure   -Any new symptom  -Vomiting that is unrelieved     Activity  When you have received medication for your procedure you may feel tired, forgetful, and lightheaded. Your judgement and motor abilities may be slightly impaired for a period of twenty-four (24) hours.   -Do not drive a vehicle or operate machinery  -Avoid alcoholic beverages  -Postpone signing of legal documents or making important decisions  -Limit your activities  To assist with cramping and bloating use a heating pad or heat pack. Place a towel between the pack and your skin. Leave the heat applied for 20-30 minutes. Remove if your skin turns bright red.     Diet  Begin your diet with liquids and light food (jello, soups, toast, juice, etc.). Progress to your normal diet if you are not nauseated unless otherwise ordered by your physician. Drink enough liquids to keep your urine clear or pale yellow.     Medications  If Applicable, take regularly scheduled medications as prescribed by your physician    Follow-up  Refer back to the first page of this After Visit Summary to view upcoming appointments you have scheduled.     Results  Dr. Corral will call you with results in 7-10 Business Days for Follow up Care however *Please note that some results may take up to 3 weeks.*      *If you have any questions or  problems, please call [facility] at [phone].  After hours, please call the Physician's Office at [phone].   If you feel it is an emergency, go directly to the ER at [facility].

## 2025-06-23 ENCOUNTER — OFFICE VISIT (OUTPATIENT)
Dept: NEUROLOGY | Facility: CLINIC | Age: 75
End: 2025-06-23
Payer: MEDICARE

## 2025-06-23 VITALS
HEIGHT: 69 IN | SYSTOLIC BLOOD PRESSURE: 140 MMHG | BODY MASS INDEX: 33.03 KG/M2 | DIASTOLIC BLOOD PRESSURE: 90 MMHG | OXYGEN SATURATION: 93 % | HEART RATE: 70 BPM | WEIGHT: 223 LBS | RESPIRATION RATE: 16 BRPM

## 2025-06-23 DIAGNOSIS — R41.840 COGNITIVE ATTENTION DEFICIT: Primary | ICD-10-CM

## 2025-06-23 DIAGNOSIS — I67.9 SMALL VESSEL DISEASE, CEREBROVASCULAR: ICD-10-CM

## 2025-06-23 DIAGNOSIS — S06.0X0S CONCUSSION WITHOUT LOSS OF CONSCIOUSNESS, SEQUELA: ICD-10-CM

## 2025-06-23 RX ORDER — ASCORBIC ACID 500 MG
500 TABLET ORAL AS DIRECTED
COMMUNITY
Start: 2024-03-01

## 2025-06-23 RX ORDER — PROCHLORPERAZINE 25 MG/1
SUPPOSITORY RECTAL
COMMUNITY
Start: 2025-05-12

## 2025-06-23 RX ORDER — GLIPIZIDE 5 MG/1
2.5 TABLET ORAL DAILY PRN
COMMUNITY
Start: 2025-06-08

## 2025-06-23 RX ORDER — TRAMADOL HYDROCHLORIDE 50 MG/1
50 TABLET ORAL AS NEEDED
COMMUNITY
Start: 2025-05-21

## 2025-06-23 NOTE — PROGRESS NOTES
NEUROLOGY  [facility]       Luis Eduardo Briggs  12/17/1950  Primary Care Provider:  PARVEEN Mishra    6/23/2025  74 year old yo,     Last visit:  April 2025  Assessment & plans last visit:  Post head trauma and memory difficulty    Previous visit and existing record notes reviewed in preparation for the face to face visit.  Relevant labs and studies reviewed and will be noted in relevant areas of this record.  Chaperoned visit: wife    () No.      Present condition:  No changes   has not improved  Same complaints  Here to discuss MRI report and next options    Past History Reviewed & update/new problem(s): none    Review of Systems:  Review of Systems:  Denies systemic symptoms     No CP or SOB.  No GI or  symptoms. Relevant Neuro as noted above.      Medications:    Medications - Current[1]  PRN: PRN Medications[2]    Allergies:  Allergies[3]       EXAM:  /90 (BP Location: Right arm, Patient Position: Sitting, Cuff Size: adult)   Pulse 70   Resp 16   Ht 69\"   Wt 223 lb (101.2 kg)   SpO2 93%   BMI 32.93 kg/m²   Looks stated age  General Exam:  HENT:  pink conjunctiva anicteric sclerae  Neck no adenopathy, thyroid normal  Heart and Lungs:  normal  Extremities: no cyanosis, skin changes    NEURO  NEURO  Able to relate events with fluent speech and intact comprehension  Recall is slow and impaired 1/5 words  Serial 7 impaired  Digit span #4  CN 2-12: pupils reactive, VF full face symmetric sensation and movement tongue midline  No motor focal findings  Sensory: no lateralizing findings  Reflexes are symmetric  UMN signs: none  Gait: narrow based          INTERPRETATION of RELEVANT LABS and other DATA:    [place] Grade 1-2 white matter changes  Some atrophy noted      Problem/s Identified this visit:   1. Cognitive attention deficit    2. Concussion without loss of consciousness, sequela    3. Small vessel disease, cerebrovascular          Discussion plus Diagnostics & Treatment  Orders:  Refer to Dr Dobson for Neuropsychological [id number]  Treatment to be discussed after test        (x) Discussed potential side effects of any treatment relevant to above.  Includes explanation of tests as necessary.    Return in about 3 months (around 9/23/2025).      Patient understands that if needed, based on condition and or test results, follow up will be readjusted      Mario Quintanilla MD  Vascular & General Neurology  Director, Multiple Sclerosis Program  [facility]  6/23/2025, Time completed 3:43 PM    Decision making:  ( x ) labs reviewed/ordered - 1  (  ) new diagnosis: - 1  ( x) Images & studies independently reviewed -non F2F  (  ) Case/studies discussed with other caregivers - -non F2F  (  ) Telephone time with patiern or authorized Fam member--non F2F  ( x ) other records reviewed --non F2F including consultations  (  ) [facility] meetings - patient not present --non F2F  (  ) Independent Historian obtained    Non Face to Face CPT code 43879/03442 applies as documented above    PROCEDURE DONE     (   ) see notes        After visit, patient was escorted out and handed-off discharge process and instructions to the check out desk.  No additional issues relevant to visit were raised to staff at this time interval.        This document is to be interpreted as my current opinion regarding the case as of the stated date of service based on the information available to me at this time and may supersedes any prior opinion expressed either orally or in writing.  Services rendered are only within the scope of direct medical care  Sometimes, reports may have been prepared partially using a speech recognition software technology.  If a word or phrase is confusing or out of context, please do not hesitate to call for clarification.              [1]   Current Outpatient Medications:     ascorbic acid 500 MG Oral Tab, Take 1 tablet (500 mg total) by mouth As Directed., Disp: , Rfl:     glipiZIDE 5  MG Oral Tab, Take 0.5 tablets (2.5 mg total) by mouth daily as needed (glucose is over 150 to take)., Disp: , Rfl:     traMADol 50 MG Oral Tab, Take 1 tablet (50 mg total) by mouth as needed for Pain., Disp: , Rfl:     Continuous Glucose Sensor (DEXCOM G6 SENSOR) Does not apply Misc, continuous, Disp: , Rfl:     Meloxicam 15 MG Oral Tab, Take 1 tablet (15 mg total) by mouth daily., Disp: , Rfl:     carvedilol 25 MG Oral Tab, Take 1 tablet (25 mg total) by mouth 2 (two) times daily with meals., Disp: , Rfl:     torsemide 20 MG Oral Tab, Take 1 tablet (20 mg total) by mouth daily., Disp: , Rfl:     amLODIPine 5 MG Oral Tab, Take 1 tablet (5 mg total) by mouth daily., Disp: 30 tablet, Rfl: 0    ALPRAZolam 0.5 MG Oral Tab, Take 1 tablet (0.5 mg total) by mouth 2 (two) times daily as needed., Disp: , Rfl:     tamsulosin 0.4 MG Oral Cap, Take 1 capsule (0.4 mg total) by mouth at bedtime., Disp: , Rfl:     Budesonide-Formoterol Fumarate 80-4.5 MCG/ACT Inhalation Aerosol, Inhale 1 puff into the lungs 2 (two) times daily., Disp: , Rfl:     Esomeprazole Magnesium 40 MG Oral Capsule Delayed Release, Take 1 capsule (40 mg total) by mouth 2 (two) times daily before meals., Disp: , Rfl:     hydrALAzine HCl 50 MG Oral Tab, Take 1.5 tablets (75 mg total) by mouth 2 (two) times daily., Disp: , Rfl:     Multiple Vitamins-Minerals (CENTRUM ULTRA MENS) Oral Tab, Take by mouth., Disp: , Rfl:     Potassium Chloride ER 10 MEQ Oral Tab CR, Take 1 tablet (10 mEq total) by mouth daily., Disp: , Rfl:     Atorvastatin Calcium 40 MG Oral Tab, Take 1 tablet (40 mg total) by mouth nightly., Disp: 30 tablet, Rfl: 6    allopurinol 300 MG Oral Tab, Take 1 tablet (300 mg total) by mouth daily., Disp: , Rfl:     Clopidogrel Bisulfate 75 MG Oral Tab, Take 1 tablet (75 mg total) by mouth daily., Disp: , Rfl:   [2] [3]   Allergies  Allergen Reactions    Clonidine Derivatives OTHER (SEE COMMENTS)    Doxycycline OTHER (SEE COMMENTS)     Other Reaction(s):  Other (see comments)    Metformin OTHER (SEE COMMENTS)     Other Reaction(s): Other (see comments)      Increased phlegm    Increased phlegm    Lisinopril Coughing

## 2025-06-23 NOTE — PATIENT INSTRUCTIONS
Refill policies:    Allow 2-3 business days for refills; controlled substances may take longer.  Contact your pharmacy at least 5 days prior to running out of medication and have them send an electronic request or submit request through the “request refill” option in your [employer] account.  Refills are not addressed on weekends; covering physicians do not authorize routine medications on weekends.  No narcotics or controlled substances are refilled after noon on Fridays or by on call physicians.  By law, narcotics must be electronically prescribed.  A 30 day supply with no refills is the maximum allowed.  If your prescription is due for a refill, you may be due for a follow up appointment.  To best provide you care, patients receiving routine medications need to be seen at least once a year.  Patients receiving narcotic/controlled substance medications need to be seen at least once every 3 months.  In the event that your preferred pharmacy does not have the requested medication in stock (e.g. Backordered), it is your responsibility to find another pharmacy that has the requested medication available.  We will gladly send a new prescription to that pharmacy at your request.    Scheduling Tests:    If your physician has ordered radiology tests such as MRI or CT scans, please contact Central Scheduling at [phone] right away to schedule the test.  Once scheduled, the [facility] Centralized Referral Team will work with your insurance carrier to obtain pre-certification or prior authorization.  Depending on your insurance carrier, approval may take 3-10 days.  It is highly recommended patients assure they have received an authorization before having a test performed.  If test is done without insurance authorization, patient may be responsible for the entire amount billed.      Precertification and Prior Authorizations:  If your physician has recommended that you have a procedure or additional testing performed the [facility]  Centralized Referral Team will contact your insurance carrier to obtain pre-certification or prior authorization.    You are strongly encouraged to contact your insurance carrier to verify that your procedure/test has been approved and is a COVERED benefit.  Although the [facility] Centralized Referral Team does its due diligence, the insurance carrier gives the disclaimer that \"Although the procedure is authorized, this does not guarantee payment.\"    Ultimately the patient is responsible for payment.   Thank you for your understanding in this matter.  Paperwork Completion:  If you require FMLA or disability paperwork for your recovery, please make sure to either drop it off or have it faxed to our office at [phone]. Be sure the form has your name and date of birth on it.  The form will be faxed to our Forms Department and they will complete it for you.  There is a 25$ fee for all forms that need to be filled out.  Please be aware there is a 10-14 day turnaround time.  You will need to sign a release of information (MARCY) form if your paperwork does not come with one.  You may call the Forms Department with any questions at [phone].  Their fax number is [phone].

## (undated) NOTE — ED AVS SNAPSHOT
THE [facility] Emergency Department in [address]    Phone:  [phone]    Fax:  [phone] [facility]   MRN: [id number]    Department:  THE [facility] Emergency Department in [place]   Date of Visit: IF THERE IS ANY CHANGE OR WORSENING OF YOUR CONDITION, CALL YOUR PRIMARY CARE PHYSICIAN AT ONCE OR RETURN IMMEDIATELY TO THE EMERGENCY DEPARTMENT.     If you have been prescribed any medication(s), please fill your prescription right away and begin taking t

## (undated) NOTE — ED AVS SNAPSHOT
[facility] Emergency Department in [address] [facility]    Phone:  [phone]    Fax:  [phone] [facility]   MRN: [id number]    Department:  [facility] Emergency Department in [place]   Date of Visit: [address] [place] [phone]  Pediatric [phone] Emergency Department   [phone]       To Check ER Wait Times:  TEXT 'ERwait' to 69029      Click [email or web address]      Or call [phone]    If you have any will be contacted. Please make sure we have your correct phone number before you leave. After you leave, you should follow the attached instructions. I have read and understand the instructions given to me by my caregivers.         [facility] Sign up for [employer], your secure online medical record. [employer] will allow you to access patient instructions from your recent visit,  view other health information, and more. To sign up or find more information, go to [email or web address]

## (undated) NOTE — ED AVS SNAPSHOT
Alfredo Davis   MRN: [id number]    Department:  THE [facility] Emergency Department in [place]   Date of Visit:  9/2/2017           Disclosure     Insurance plans vary and the physician(s) referred by the ER may not be covered by your plan.  Please contact If you have been prescribed any medication(s), please fill your prescription right away and begin taking the medication(s) as directed    If the emergency physician has read X-rays, these will be re-interpreted by a radiologist.  If there is a significant

## (undated) NOTE — ED AVS SNAPSHOT
Alon Ni   MRN: [id number]    Department:  [facility] in [place]   Date of Visit:  12/5/2017           Disclosure     Insurance plans vary and the physician(s) referred by the ER may not be covered by your plan.  Please contact tell this physician (or your personal doctor if your instructions are to return to your personal doctor) about any new or lasting problems. The primary care or specialist physician will see patients referred from the [facility] Emergency Department.  Swetha Ng